# Patient Record
Sex: MALE | Race: BLACK OR AFRICAN AMERICAN | NOT HISPANIC OR LATINO
[De-identification: names, ages, dates, MRNs, and addresses within clinical notes are randomized per-mention and may not be internally consistent; named-entity substitution may affect disease eponyms.]

---

## 2017-09-13 ENCOUNTER — APPOINTMENT (OUTPATIENT)
Dept: BARIATRICS | Facility: CLINIC | Age: 57
End: 2017-09-13
Payer: COMMERCIAL

## 2017-09-13 VITALS
SYSTOLIC BLOOD PRESSURE: 112 MMHG | DIASTOLIC BLOOD PRESSURE: 69 MMHG | BODY MASS INDEX: 30.13 KG/M2 | HEART RATE: 71 BPM | TEMPERATURE: 97.5 F | OXYGEN SATURATION: 98 % | HEIGHT: 71.5 IN | WEIGHT: 220 LBS

## 2017-09-13 PROCEDURE — 99203 OFFICE O/P NEW LOW 30 MIN: CPT

## 2017-09-20 ENCOUNTER — FORM ENCOUNTER (OUTPATIENT)
Age: 57
End: 2017-09-20

## 2017-09-21 ENCOUNTER — APPOINTMENT (OUTPATIENT)
Dept: ORTHOPEDIC SURGERY | Facility: CLINIC | Age: 57
End: 2017-09-21
Payer: COMMERCIAL

## 2017-09-21 ENCOUNTER — OUTPATIENT (OUTPATIENT)
Dept: OUTPATIENT SERVICES | Facility: HOSPITAL | Age: 57
LOS: 1 days | End: 2017-09-21
Payer: COMMERCIAL

## 2017-09-21 DIAGNOSIS — Z41.9 ENCOUNTER FOR PROCEDURE FOR PURPOSES OTHER THAN REMEDYING HEALTH STATE, UNSPECIFIED: Chronic | ICD-10-CM

## 2017-09-21 DIAGNOSIS — Z47.89 ENCOUNTER FOR OTHER ORTHOPEDIC AFTERCARE: ICD-10-CM

## 2017-09-21 PROCEDURE — 73502 X-RAY EXAM HIP UNI 2-3 VIEWS: CPT | Mod: 26,RT

## 2017-09-21 PROCEDURE — 73502 X-RAY EXAM HIP UNI 2-3 VIEWS: CPT

## 2017-09-21 PROCEDURE — 99214 OFFICE O/P EST MOD 30 MIN: CPT

## 2017-10-13 NOTE — ASU PATIENT PROFILE, ADULT - PMH
Hernia    HTN (hypertension)    MMT (medial meniscus tear)  right knee  Osteoarthritis of knee  bilateral

## 2017-10-16 ENCOUNTER — OUTPATIENT (OUTPATIENT)
Dept: INPATIENT UNIT | Facility: HOSPITAL | Age: 57
LOS: 1 days | Discharge: HOME | End: 2017-10-16
Payer: COMMERCIAL

## 2017-10-16 ENCOUNTER — APPOINTMENT (OUTPATIENT)
Dept: BARIATRICS | Facility: HOSPITAL | Age: 57
End: 2017-10-16

## 2017-10-16 VITALS
HEIGHT: 72 IN | RESPIRATION RATE: 18 BRPM | SYSTOLIC BLOOD PRESSURE: 112 MMHG | TEMPERATURE: 99 F | WEIGHT: 218.7 LBS | HEART RATE: 63 BPM | DIASTOLIC BLOOD PRESSURE: 68 MMHG | OXYGEN SATURATION: 100 %

## 2017-10-16 VITALS
RESPIRATION RATE: 22 BRPM | SYSTOLIC BLOOD PRESSURE: 118 MMHG | DIASTOLIC BLOOD PRESSURE: 80 MMHG | HEART RATE: 60 BPM | OXYGEN SATURATION: 98 %

## 2017-10-16 DIAGNOSIS — Z41.9 ENCOUNTER FOR PROCEDURE FOR PURPOSES OTHER THAN REMEDYING HEALTH STATE, UNSPECIFIED: Chronic | ICD-10-CM

## 2017-10-16 LAB
BLD GP AB SCN SERPL QL: NEGATIVE — SIGNIFICANT CHANGE UP
GLUCOSE BLDC GLUCOMTR-MCNC: 87 MG/DL — SIGNIFICANT CHANGE UP (ref 70–99)
RH IG SCN BLD-IMP: POSITIVE — SIGNIFICANT CHANGE UP

## 2017-10-16 PROCEDURE — 86850 RBC ANTIBODY SCREEN: CPT

## 2017-10-16 PROCEDURE — C1781: CPT

## 2017-10-16 PROCEDURE — C1889: CPT

## 2017-10-16 PROCEDURE — 49650 LAP ING HERNIA REPAIR INIT: CPT | Mod: GC

## 2017-10-16 PROCEDURE — 86900 BLOOD TYPING SEROLOGIC ABO: CPT

## 2017-10-16 PROCEDURE — 82962 GLUCOSE BLOOD TEST: CPT

## 2017-10-16 PROCEDURE — 86901 BLOOD TYPING SEROLOGIC RH(D): CPT

## 2017-10-16 PROCEDURE — 49650 LAP ING HERNIA REPAIR INIT: CPT | Mod: RT

## 2017-10-16 RX ORDER — OXYCODONE AND ACETAMINOPHEN 5; 325 MG/1; MG/1
1 TABLET ORAL EVERY 4 HOURS
Qty: 0 | Refills: 0 | Status: DISCONTINUED | OUTPATIENT
Start: 2017-10-16 | End: 2017-10-16

## 2017-10-16 RX ORDER — MORPHINE SULFATE 50 MG/1
2 CAPSULE, EXTENDED RELEASE ORAL EVERY 4 HOURS
Qty: 0 | Refills: 0 | Status: DISCONTINUED | OUTPATIENT
Start: 2017-10-16 | End: 2017-10-16

## 2017-10-16 NOTE — BRIEF OPERATIVE NOTE - PROCEDURE
<<-----Click on this checkbox to enter Procedure Inguinal hernia repair with mesh  10/16/2017    Active  CHELY

## 2017-10-16 NOTE — BRIEF OPERATIVE NOTE - OPERATION/FINDINGS
Laparoscopic inguinal hernia repair with 3D max mesh on R side. Scarred-in incarcerated hernia with extensive reduction of sac.

## 2017-10-17 PROBLEM — K46.9 UNSPECIFIED ABDOMINAL HERNIA WITHOUT OBSTRUCTION OR GANGRENE: Chronic | Status: ACTIVE | Noted: 2017-10-13

## 2017-10-20 ENCOUNTER — OTHER (OUTPATIENT)
Age: 57
End: 2017-10-20

## 2017-10-20 DIAGNOSIS — K40.90 UNILATERAL INGUINAL HERNIA, WITHOUT OBSTRUCTION OR GANGRENE, NOT SPECIFIED AS RECURRENT: ICD-10-CM

## 2017-11-15 ENCOUNTER — APPOINTMENT (OUTPATIENT)
Dept: BARIATRICS | Facility: CLINIC | Age: 57
End: 2017-11-15
Payer: COMMERCIAL

## 2017-11-15 VITALS
SYSTOLIC BLOOD PRESSURE: 115 MMHG | HEART RATE: 61 BPM | DIASTOLIC BLOOD PRESSURE: 77 MMHG | OXYGEN SATURATION: 97 % | TEMPERATURE: 97.4 F | HEIGHT: 71.5 IN | WEIGHT: 222 LBS | BODY MASS INDEX: 30.4 KG/M2

## 2017-11-15 PROCEDURE — 99024 POSTOP FOLLOW-UP VISIT: CPT

## 2017-12-13 ENCOUNTER — APPOINTMENT (OUTPATIENT)
Dept: BARIATRICS | Facility: CLINIC | Age: 57
End: 2017-12-13
Payer: COMMERCIAL

## 2017-12-13 VITALS
WEIGHT: 230 LBS | DIASTOLIC BLOOD PRESSURE: 77 MMHG | TEMPERATURE: 97.4 F | BODY MASS INDEX: 31.5 KG/M2 | HEIGHT: 71.5 IN | HEART RATE: 69 BPM | OXYGEN SATURATION: 98 % | SYSTOLIC BLOOD PRESSURE: 120 MMHG

## 2017-12-13 PROCEDURE — 99024 POSTOP FOLLOW-UP VISIT: CPT

## 2018-01-23 ENCOUNTER — APPOINTMENT (OUTPATIENT)
Dept: ORTHOPEDIC SURGERY | Facility: CLINIC | Age: 58
End: 2018-01-23
Payer: COMMERCIAL

## 2018-01-23 VITALS — HEIGHT: 66 IN | BODY MASS INDEX: 34.87 KG/M2 | WEIGHT: 217 LBS

## 2018-01-23 PROCEDURE — 99214 OFFICE O/P EST MOD 30 MIN: CPT

## 2018-02-12 ENCOUNTER — LABORATORY RESULT (OUTPATIENT)
Age: 58
End: 2018-02-12

## 2018-02-14 ENCOUNTER — APPOINTMENT (OUTPATIENT)
Dept: BARIATRICS | Facility: CLINIC | Age: 58
End: 2018-02-14
Payer: COMMERCIAL

## 2018-02-14 VITALS
HEIGHT: 66 IN | OXYGEN SATURATION: 100 % | HEART RATE: 58 BPM | WEIGHT: 229 LBS | DIASTOLIC BLOOD PRESSURE: 72 MMHG | TEMPERATURE: 97.3 F | SYSTOLIC BLOOD PRESSURE: 120 MMHG | BODY MASS INDEX: 36.8 KG/M2

## 2018-02-14 PROCEDURE — 99213 OFFICE O/P EST LOW 20 MIN: CPT

## 2018-02-28 ENCOUNTER — OUTPATIENT (OUTPATIENT)
Dept: OUTPATIENT SERVICES | Facility: HOSPITAL | Age: 58
LOS: 1 days | End: 2018-02-28
Payer: COMMERCIAL

## 2018-02-28 DIAGNOSIS — Z22.321 CARRIER OR SUSPECTED CARRIER OF METHICILLIN SUSCEPTIBLE STAPHYLOCOCCUS AUREUS: ICD-10-CM

## 2018-02-28 DIAGNOSIS — Z41.9 ENCOUNTER FOR PROCEDURE FOR PURPOSES OTHER THAN REMEDYING HEALTH STATE, UNSPECIFIED: Chronic | ICD-10-CM

## 2018-02-28 LAB
MRSA PCR RESULT.: NEGATIVE — SIGNIFICANT CHANGE UP
S AUREUS DNA NOSE QL NAA+PROBE: NEGATIVE — SIGNIFICANT CHANGE UP

## 2018-02-28 PROCEDURE — 87641 MR-STAPH DNA AMP PROBE: CPT

## 2018-03-02 ENCOUNTER — APPOINTMENT (OUTPATIENT)
Dept: INTERNAL MEDICINE | Facility: CLINIC | Age: 58
End: 2018-03-02
Payer: COMMERCIAL

## 2018-03-02 VITALS
HEART RATE: 64 BPM | RESPIRATION RATE: 14 BRPM | OXYGEN SATURATION: 98 % | SYSTOLIC BLOOD PRESSURE: 114 MMHG | TEMPERATURE: 97.6 F | DIASTOLIC BLOOD PRESSURE: 70 MMHG | BODY MASS INDEX: 35.51 KG/M2 | WEIGHT: 220 LBS

## 2018-03-02 DIAGNOSIS — M17.10 UNILATERAL PRIMARY OSTEOARTHRITIS, UNSPECIFIED KNEE: ICD-10-CM

## 2018-03-02 DIAGNOSIS — Z01.818 ENCOUNTER FOR OTHER PREPROCEDURAL EXAMINATION: ICD-10-CM

## 2018-03-02 DIAGNOSIS — I10 ESSENTIAL (PRIMARY) HYPERTENSION: ICD-10-CM

## 2018-03-02 PROCEDURE — 99204 OFFICE O/P NEW MOD 45 MIN: CPT | Mod: 25

## 2018-03-02 PROCEDURE — 93000 ELECTROCARDIOGRAM COMPLETE: CPT

## 2018-03-12 ENCOUNTER — MEDICATION RENEWAL (OUTPATIENT)
Age: 58
End: 2018-03-12

## 2018-03-16 RX ORDER — ACETAMINOPHEN 500 MG
975 TABLET ORAL ONCE
Qty: 0 | Refills: 0 | Status: DISCONTINUED | OUTPATIENT
Start: 2018-03-19 | End: 2018-03-20

## 2018-03-16 RX ORDER — CELECOXIB 200 MG/1
400 CAPSULE ORAL ONCE
Qty: 0 | Refills: 0 | Status: COMPLETED | OUTPATIENT
Start: 2018-03-19 | End: 2018-03-19

## 2018-03-16 RX ORDER — OXYCODONE HYDROCHLORIDE 5 MG/1
10 TABLET ORAL ONCE
Qty: 0 | Refills: 0 | Status: DISCONTINUED | OUTPATIENT
Start: 2018-03-19 | End: 2018-03-19

## 2018-03-16 NOTE — H&P ADULT - NSHPLABSRESULTS_GEN_ALL_CORE
Preop cbc, bmp, pt/inr, ptt, ua, urine cx wnl; nasal swab neg  preop cxr wnl per clearance  preop ekg wnl per clearance

## 2018-03-16 NOTE — H&P ADULT - NSHPPHYSICALEXAM_GEN_ALL_CORE
Right hip decreased rom 2/2 pain  Rest of PE per MD clearance MSK- No erythema/ecchymosis, STS. SLT INTACT, DP/PT 2+, EHL/TA/GS 5/5 b/l les, Right hip decreased rom 2/2 pain  Rest of PE per MD clearance

## 2018-03-16 NOTE — H&P ADULT - HISTORY OF PRESENT ILLNESS
57M c/o right hip pain  Presents today for elective right THR. 57M c/o right hip pain x 2.5y. Pt. denies any accident of injury to right hip. Pt. c/o pain with walking and getting up. Pt. reports right hip pain has decreased his quality of life. Pt. states he has not done any physical therapy or conservative treatments for right hip. Denies any numbness/tingling in b/l les or walker assistance.   Presents today for elective right THR. 57M c/o right hip pain x 2.5y. Pt. denies any accident of injury to right hip. Pt. c/o pain with walking and getting up. Pt. reports right hip pain has decreased his quality of life. No improvement with analgesic medication, activity modification and therapeutic exercise. Denies any numbness/tingling in b/l les or walker assistance.   Presents today for elective right THR.

## 2018-03-19 ENCOUNTER — INPATIENT (INPATIENT)
Facility: HOSPITAL | Age: 58
LOS: 0 days | Discharge: HOME | DRG: 470 | End: 2018-03-20
Attending: ORTHOPAEDIC SURGERY | Admitting: ORTHOPAEDIC SURGERY
Payer: COMMERCIAL

## 2018-03-19 ENCOUNTER — APPOINTMENT (OUTPATIENT)
Dept: ORTHOPEDIC SURGERY | Facility: HOSPITAL | Age: 58
End: 2018-03-19

## 2018-03-19 ENCOUNTER — RESULT REVIEW (OUTPATIENT)
Age: 58
End: 2018-03-19

## 2018-03-19 VITALS
DIASTOLIC BLOOD PRESSURE: 71 MMHG | SYSTOLIC BLOOD PRESSURE: 132 MMHG | RESPIRATION RATE: 14 BRPM | WEIGHT: 222.89 LBS | HEART RATE: 63 BPM | TEMPERATURE: 97 F | HEIGHT: 72 IN

## 2018-03-19 DIAGNOSIS — M16.11 UNILATERAL PRIMARY OSTEOARTHRITIS, RIGHT HIP: ICD-10-CM

## 2018-03-19 DIAGNOSIS — Z41.9 ENCOUNTER FOR PROCEDURE FOR PURPOSES OTHER THAN REMEDYING HEALTH STATE, UNSPECIFIED: Chronic | ICD-10-CM

## 2018-03-19 DIAGNOSIS — I10 ESSENTIAL (PRIMARY) HYPERTENSION: ICD-10-CM

## 2018-03-19 PROCEDURE — 27130 TOTAL HIP ARTHROPLASTY: CPT | Mod: RT

## 2018-03-19 PROCEDURE — 72170 X-RAY EXAM OF PELVIS: CPT | Mod: 26

## 2018-03-19 RX ORDER — SODIUM CHLORIDE 9 MG/ML
1000 INJECTION, SOLUTION INTRAVENOUS
Qty: 0 | Refills: 0 | Status: DISCONTINUED | OUTPATIENT
Start: 2018-03-19 | End: 2018-03-20

## 2018-03-19 RX ORDER — KETOROLAC TROMETHAMINE 30 MG/ML
15 SYRINGE (ML) INJECTION ONCE
Qty: 0 | Refills: 0 | Status: DISCONTINUED | OUTPATIENT
Start: 2018-03-19 | End: 2018-03-20

## 2018-03-19 RX ORDER — ACETAMINOPHEN 500 MG
650 TABLET ORAL EVERY 6 HOURS
Qty: 0 | Refills: 0 | Status: DISCONTINUED | OUTPATIENT
Start: 2018-03-19 | End: 2018-03-20

## 2018-03-19 RX ORDER — CELECOXIB 200 MG/1
200 CAPSULE ORAL
Qty: 0 | Refills: 0 | Status: DISCONTINUED | OUTPATIENT
Start: 2018-03-19 | End: 2018-03-20

## 2018-03-19 RX ORDER — SODIUM CHLORIDE 9 MG/ML
1000 INJECTION, SOLUTION INTRAVENOUS
Qty: 0 | Refills: 0 | Status: DISCONTINUED | OUTPATIENT
Start: 2018-03-19 | End: 2018-03-19

## 2018-03-19 RX ORDER — POLYETHYLENE GLYCOL 3350 17 G/17G
17 POWDER, FOR SOLUTION ORAL DAILY
Qty: 0 | Refills: 0 | Status: DISCONTINUED | OUTPATIENT
Start: 2018-03-19 | End: 2018-03-20

## 2018-03-19 RX ORDER — BUPIVACAINE 13.3 MG/ML
20 INJECTION, SUSPENSION, LIPOSOMAL INFILTRATION ONCE
Qty: 0 | Refills: 0 | Status: DISCONTINUED | OUTPATIENT
Start: 2018-03-19 | End: 2018-03-20

## 2018-03-19 RX ORDER — OXYCODONE HYDROCHLORIDE 5 MG/1
10 TABLET ORAL EVERY 4 HOURS
Qty: 0 | Refills: 0 | Status: DISCONTINUED | OUTPATIENT
Start: 2018-03-19 | End: 2018-03-20

## 2018-03-19 RX ORDER — OXYCODONE AND ACETAMINOPHEN 5; 325 MG/1; MG/1
2 TABLET ORAL EVERY 4 HOURS
Qty: 0 | Refills: 0 | Status: DISCONTINUED | OUTPATIENT
Start: 2018-03-19 | End: 2018-03-19

## 2018-03-19 RX ORDER — MORPHINE SULFATE 50 MG/1
2 CAPSULE, EXTENDED RELEASE ORAL
Qty: 0 | Refills: 0 | Status: DISCONTINUED | OUTPATIENT
Start: 2018-03-19 | End: 2018-03-20

## 2018-03-19 RX ORDER — CEFAZOLIN SODIUM 1 G
2000 VIAL (EA) INJECTION EVERY 8 HOURS
Qty: 0 | Refills: 0 | Status: COMPLETED | OUTPATIENT
Start: 2018-03-19 | End: 2018-03-19

## 2018-03-19 RX ORDER — DOCUSATE SODIUM 100 MG
100 CAPSULE ORAL THREE TIMES A DAY
Qty: 0 | Refills: 0 | Status: DISCONTINUED | OUTPATIENT
Start: 2018-03-19 | End: 2018-03-20

## 2018-03-19 RX ORDER — OXYCODONE AND ACETAMINOPHEN 5; 325 MG/1; MG/1
1 TABLET ORAL EVERY 4 HOURS
Qty: 0 | Refills: 0 | Status: DISCONTINUED | OUTPATIENT
Start: 2018-03-19 | End: 2018-03-19

## 2018-03-19 RX ORDER — PANTOPRAZOLE SODIUM 20 MG/1
40 TABLET, DELAYED RELEASE ORAL DAILY
Qty: 0 | Refills: 0 | Status: DISCONTINUED | OUTPATIENT
Start: 2018-03-19 | End: 2018-03-20

## 2018-03-19 RX ORDER — MAGNESIUM HYDROXIDE 400 MG/1
30 TABLET, CHEWABLE ORAL DAILY
Qty: 0 | Refills: 0 | Status: DISCONTINUED | OUTPATIENT
Start: 2018-03-19 | End: 2018-03-20

## 2018-03-19 RX ORDER — SENNA PLUS 8.6 MG/1
2 TABLET ORAL AT BEDTIME
Qty: 0 | Refills: 0 | Status: DISCONTINUED | OUTPATIENT
Start: 2018-03-19 | End: 2018-03-20

## 2018-03-19 RX ORDER — OXYCODONE HYDROCHLORIDE 5 MG/1
5 TABLET ORAL EVERY 4 HOURS
Qty: 0 | Refills: 0 | Status: DISCONTINUED | OUTPATIENT
Start: 2018-03-19 | End: 2018-03-20

## 2018-03-19 RX ORDER — ASPIRIN/CALCIUM CARB/MAGNESIUM 324 MG
325 TABLET ORAL
Qty: 0 | Refills: 0 | Status: DISCONTINUED | OUTPATIENT
Start: 2018-03-19 | End: 2018-03-20

## 2018-03-19 RX ORDER — BISOPROLOL FUMARATE 10 MG/1
10 TABLET, FILM COATED ORAL DAILY
Qty: 0 | Refills: 0 | Status: DISCONTINUED | OUTPATIENT
Start: 2018-03-19 | End: 2018-03-20

## 2018-03-19 RX ORDER — MORPHINE SULFATE 50 MG/1
3 CAPSULE, EXTENDED RELEASE ORAL
Qty: 0 | Refills: 0 | Status: DISCONTINUED | OUTPATIENT
Start: 2018-03-19 | End: 2018-03-20

## 2018-03-19 RX ORDER — ONDANSETRON 8 MG/1
4 TABLET, FILM COATED ORAL EVERY 6 HOURS
Qty: 0 | Refills: 0 | Status: DISCONTINUED | OUTPATIENT
Start: 2018-03-19 | End: 2018-03-20

## 2018-03-19 RX ADMIN — SODIUM CHLORIDE 200 MILLILITER(S): 9 INJECTION, SOLUTION INTRAVENOUS at 16:00

## 2018-03-19 RX ADMIN — Medication 650 MILLIGRAM(S): at 23:09

## 2018-03-19 RX ADMIN — OXYCODONE HYDROCHLORIDE 10 MILLIGRAM(S): 5 TABLET ORAL at 09:09

## 2018-03-19 RX ADMIN — CELECOXIB 400 MILLIGRAM(S): 200 CAPSULE ORAL at 09:08

## 2018-03-19 RX ADMIN — Medication 100 MILLIGRAM(S): at 23:08

## 2018-03-19 RX ADMIN — Medication 100 MILLIGRAM(S): at 18:19

## 2018-03-19 NOTE — PHYSICAL THERAPY INITIAL EVALUATION ADULT - CRITERIA FOR SKILLED THERAPEUTIC INTERVENTIONS
rehab potential/impairments found/anticipated discharge recommendation/functional limitations in following categories/anticipated equipment needs at discharge/therapy frequency

## 2018-03-19 NOTE — PROGRESS NOTE ADULT - SUBJECTIVE AND OBJECTIVE BOX
Ortho Post Op Check    Procedure: Right THR  Surgeon: Dr. Haro    Pt resting comfortably without complaints, pain well controlled.  Denies CP, SOB, N/V, numbness/tingling of extremities.    Vital Signs Last 24 Hrs  T(C): 36.5 (03-19-18 @ 12:50), Max: 36.5 (03-19-18 @ 12:50)  T(F): 97.7 (03-19-18 @ 12:50), Max: 97.7 (03-19-18 @ 12:50)  HR: 52 (03-19-18 @ 14:05) (52 - 63)  BP: 115/68 (03-19-18 @ 14:05) (109/58 - 118/66)  BP(mean): --  RR: 16 (03-19-18 @ 14:05) (15 - 16)  SpO2: 100% (03-19-18 @ 14:05) (99% - 100%)  AVSS    General: Pt Alert and oriented, NAD  DSG C/D/I right hip  Pulses: DP pulses 2+, toes wwp, cap refill brisk  Sensation: intact and equal to light touch  Motor: EHL/FHL/TA/GS 5/5 strength b/l      Post-op X-Ray: right hip prosthesis intact without fracture or foreign body       A/P: 57yMale POD#0 s/p right THR  - Stable  - Pain Control  - DVT ppx: ASA 325mg BID  - Post op abx: Ancef  - PT, WBS: WBAT  - F/u AM labs    Ortho Pager [phone]

## 2018-03-19 NOTE — PHYSICAL THERAPY INITIAL EVALUATION ADULT - GENERAL OBSERVATIONS, REHAB EVAL
Pt received supine in bed with b/l SCD, +EKG, +IV, NAD. Pt left supine in bed, NAD. Pt complains right hip pain~2/10 on pain scale

## 2018-03-20 ENCOUNTER — TRANSCRIPTION ENCOUNTER (OUTPATIENT)
Age: 58
End: 2018-03-20

## 2018-03-20 VITALS
DIASTOLIC BLOOD PRESSURE: 67 MMHG | RESPIRATION RATE: 16 BRPM | OXYGEN SATURATION: 99 % | SYSTOLIC BLOOD PRESSURE: 112 MMHG | TEMPERATURE: 97 F | HEART RATE: 55 BPM

## 2018-03-20 LAB
ANION GAP SERPL CALC-SCNC: 9 MMOL/L — SIGNIFICANT CHANGE UP (ref 5–17)
BUN SERPL-MCNC: 27 MG/DL — HIGH (ref 7–23)
CALCIUM SERPL-MCNC: 8.1 MG/DL — LOW (ref 8.4–10.5)
CHLORIDE SERPL-SCNC: 103 MMOL/L — SIGNIFICANT CHANGE UP (ref 96–108)
CO2 SERPL-SCNC: 25 MMOL/L — SIGNIFICANT CHANGE UP (ref 22–31)
CREAT SERPL-MCNC: 1.13 MG/DL — SIGNIFICANT CHANGE UP (ref 0.5–1.3)
GLUCOSE SERPL-MCNC: 115 MG/DL — HIGH (ref 70–99)
HCT VFR BLD CALC: 31 % — LOW (ref 39–50)
HGB BLD-MCNC: 10 G/DL — LOW (ref 13–17)
MCHC RBC-ENTMCNC: 25.9 PG — LOW (ref 27–34)
MCHC RBC-ENTMCNC: 32.3 G/DL — SIGNIFICANT CHANGE UP (ref 32–36)
MCV RBC AUTO: 80.3 FL — SIGNIFICANT CHANGE UP (ref 80–100)
PLATELET # BLD AUTO: 168 K/UL — SIGNIFICANT CHANGE UP (ref 150–400)
POTASSIUM SERPL-MCNC: 4 MMOL/L — SIGNIFICANT CHANGE UP (ref 3.5–5.3)
POTASSIUM SERPL-SCNC: 4 MMOL/L — SIGNIFICANT CHANGE UP (ref 3.5–5.3)
RBC # BLD: 3.86 M/UL — LOW (ref 4.2–5.8)
RBC # FLD: 15.4 % — SIGNIFICANT CHANGE UP (ref 10.3–16.9)
SODIUM SERPL-SCNC: 137 MMOL/L — SIGNIFICANT CHANGE UP (ref 135–145)
WBC # BLD: 9.5 K/UL — SIGNIFICANT CHANGE UP (ref 3.8–10.5)
WBC # FLD AUTO: 9.5 K/UL — SIGNIFICANT CHANGE UP (ref 3.8–10.5)

## 2018-03-20 PROCEDURE — 99253 IP/OBS CNSLTJ NEW/EST LOW 45: CPT

## 2018-03-20 PROCEDURE — 99232 SBSQ HOSP IP/OBS MODERATE 35: CPT

## 2018-03-20 RX ORDER — ASPIRIN/CALCIUM CARB/MAGNESIUM 324 MG
1 TABLET ORAL
Qty: 0 | Refills: 0 | DISCHARGE
Start: 2018-03-20

## 2018-03-20 RX ORDER — CELECOXIB 200 MG/1
1 CAPSULE ORAL
Qty: 0 | Refills: 0 | DISCHARGE
Start: 2018-03-20

## 2018-03-20 RX ADMIN — Medication 100 MILLIGRAM(S): at 05:13

## 2018-03-20 RX ADMIN — CELECOXIB 200 MILLIGRAM(S): 200 CAPSULE ORAL at 05:55

## 2018-03-20 RX ADMIN — Medication 325 MILLIGRAM(S): at 05:13

## 2018-03-20 RX ADMIN — OXYCODONE HYDROCHLORIDE 5 MILLIGRAM(S): 5 TABLET ORAL at 12:40

## 2018-03-20 RX ADMIN — Medication 650 MILLIGRAM(S): at 11:53

## 2018-03-20 RX ADMIN — OXYCODONE HYDROCHLORIDE 5 MILLIGRAM(S): 5 TABLET ORAL at 11:53

## 2018-03-20 RX ADMIN — CELECOXIB 200 MILLIGRAM(S): 200 CAPSULE ORAL at 05:13

## 2018-03-20 NOTE — CONSULT NOTE ADULT - ASSESSMENT
58 yo m with HTN and severe right hip pain now post op day#1 from an elective right THR    1) HTN- continue bisoprolol, stable overnight  2) dvt ppx with asa bid and scd's  3) pain manaement, bowel regimen  4) f/up am labs

## 2018-03-20 NOTE — PROGRESS NOTE ADULT - SUBJECTIVE AND OBJECTIVE BOX
S: Patient seen and examined. Doing well this AM. Pain reported but controlled. No acute events overnight.    O:   Vital Signs Last 24 Hrs  T(C): 37.4 (20 Mar 2018 00:29), Max: 37.4 (20 Mar 2018 00:29)  T(F): 99.4 (20 Mar 2018 00:29), Max: 99.4 (20 Mar 2018 00:29)  HR: 66 (20 Mar 2018 00:29) (52 - 78)  BP: 111/62 (20 Mar 2018 00:29) (109/58 - 132/71)  BP(mean): 82 (19 Mar 2018 18:50) (78 - 95)  RR: 16 (20 Mar 2018 00:29) (14 - 42)  SpO2: 98% (20 Mar 2018 00:29) (98% - 100%)    Motor: 5/5 GS/TA/EHL/FHL BL   SILT to patients baseline BL  WWP DP PT BL  Dressing C/D/I

## 2018-03-20 NOTE — DISCHARGE NOTE ADULT - PATIENT PORTAL LINK FT
You can access the [facility] Patient Portal, offered by [facility], by registering with the following website: [email or web address]

## 2018-03-20 NOTE — DISCHARGE NOTE ADULT - ADDITIONAL INSTRUCTIONS
**Please take pain medications as prescribed by Dr. Haro.**  You may refer to Dr. Haro's separate instruction packet for further information.  No strenuous activity, heavy lifting, driving or returning to work until cleared by MD.  You may shower - dressing is water-resistant, no soaking in bathtubs.  Remove dressing after post op day 5-7, then leave incision open to air. Keep incision clean and dry.  Try to have regular bowel movements, take stool softener or laxative if necessary.  May take Pepcid or Zantac for upset stomach.  May take Aleve or Naproxen instead of Meloxicam.  Swelling may travel all the way down leg to foot, this is normal and will subside in a few weeks.  Call to schedule an appointment with Dr. Haro for follow up, if you have staples or sutures they will be removed in office.  Contact your doctor if you experience: fever greater than 101.5, chills, chest pain, difficulty breathing, redness or excessive drainage around the incision, other concerns.   Follow up with your primary care provider.

## 2018-03-20 NOTE — DISCHARGE NOTE ADULT - MEDICATION SUMMARY - MEDICATIONS TO TAKE
I will START or STAY ON the medications listed below when I get home from the hospital:    aspirin 325 mg oral delayed release tablet  -- 1 tab(s) by mouth 2 times a day  -- Indication: For Clot prevention    celecoxib 200 mg oral capsule  -- 1 cap(s) by mouth 2 times a day  -- Indication: For Pain/Inflammation    Percocet 5/325 oral tablet  -- 1-2 tab(s) by mouth every 4-6 hours, as needed for pain.  -- Indication: For Pain    bisoprolol 10 mg oral tablet  -- 1 tab(s) by mouth once a day  -- Indication: For HTN (hypertension)    omeprazole 20 mg oral delayed release capsule  -- 1 cap(s) by mouth once a day  -- Indication: For GI prophylaxis

## 2018-03-20 NOTE — DISCHARGE NOTE ADULT - CARE PLAN
Principal Discharge DX:	Primary osteoarthritis of right hip  Goal:	Improvement after surgery.  Assessment and plan of treatment:	See below.

## 2018-03-20 NOTE — DISCHARGE NOTE ADULT - MEDICATION SUMMARY - MEDICATIONS TO STOP TAKING
I will STOP taking the medications listed below when I get home from the hospital:    Aleve 220 mg oral tablet

## 2018-03-20 NOTE — PROGRESS NOTE ADULT - SUBJECTIVE AND OBJECTIVE BOX
Chief Complaint/Reason for Consult: periop cv mgmt  INTERVAL HPI: post op s complications no cp sob palp  	  MEDICATIONS:  bisoprolol   Tablet 10 milliGRAM(s) Oral daily        acetaminophen   Tablet 650 milliGRAM(s) Oral every 6 hours PRN  acetaminophen   Tablet 650 milliGRAM(s) Oral every 6 hours  acetaminophen   Tablet. 975 milliGRAM(s) Oral once  celecoxib 200 milliGRAM(s) Oral two times a day  ketorolac   Injectable 15 milliGRAM(s) IV Push once  morphine  - Injectable 2 milliGRAM(s) IV Push every 10 minutes PRN  morphine  - Injectable 3 milliGRAM(s) IV Push every 3 hours PRN  ondansetron Injectable 4 milliGRAM(s) IV Push every 6 hours PRN  oxyCODONE    IR 5 milliGRAM(s) Oral every 4 hours PRN  oxyCODONE    IR 10 milliGRAM(s) Oral every 4 hours PRN    aluminum hydroxide/magnesium hydroxide/simethicone Suspension 30 milliLiter(s) Oral four times a day PRN  docusate sodium 100 milliGRAM(s) Oral three times a day  magnesium hydroxide Suspension 30 milliLiter(s) Oral daily PRN  pantoprazole    Tablet 40 milliGRAM(s) Oral daily  polyethylene glycol 3350 17 Gram(s) Oral daily  senna 2 Tablet(s) Oral at bedtime PRN      aspirin enteric coated 325 milliGRAM(s) Oral two times a day  lactated ringers. 1000 milliLiter(s) IV Continuous <Continuous>      REVIEW OF SYSTEMS:  [x] As per HPI  CONSTITUTIONAL: No fever, weight loss, or fatigue  RESPIRATORY: No cough, wheezing, chills or hemoptysis; No Shortness of Breath  CARDIOVASCULAR: No chest pain, palpitations, dizziness, or leg swelling  GASTROINTESTINAL: No abdominal or epigastric pain. No nausea, vomiting, or hematemesis; No diarrhea or constipation. No melena or hematochezia.  MUSCULOSKELETAL: No joint pain or swelling; No muscle, back, or extremity pain  [x] All others negative	  [ ] Unable to obtain    PHYSICAL EXAM:  T(C): 36.1 (03-20-18 @ 08:21), Max: 37.4 (03-20-18 @ 00:29)  HR: 55 (03-20-18 @ 08:21) (52 - 78)  BP: 112/67 (03-20-18 @ 08:21) (99/56 - 125/75)  RR: 16 (03-20-18 @ 08:21) (15 - 42)  SpO2: 99% (03-20-18 @ 08:21) (98% - 100%)  Wt(kg): --  I&O's Summary    19 Mar 2018 07:01  -  20 Mar 2018 07:00  --------------------------------------------------------  IN: 1920 mL / OUT: 300 mL / NET: 1620 mL    20 Mar 2018 07:01  -  20 Mar 2018 13:42  --------------------------------------------------------  IN: 340 mL / OUT: 400 mL / NET: -60 mL          Appearance: Normal	  HEENT:   Normal oral mucosa  Cardiovascular: Normal S1 S2, No JVD, No murmurs, No edema  Respiratory: Lungs clear to auscultation	  Gastrointestinal:  Soft, Non-tender, + BS	  Extremities: Normal range of motion, No clubbing, cyanosis or edema  Vascular: Peripheral pulses palpable 2+ bilaterally    TELEMETRY: 	    ECG:   	  RADIOLOGY:   CXR:  CT:  US:    CARDIAC TESTING:  Echocardiogram:  Catheterization:  Stress Test:      LABS:	 	    CARDIAC MARKERS:                                  10.0   9.5   )-----------( 168      ( 20 Mar 2018 06:37 )             31.0     03-20    137  |  103  |  27<H>  ----------------------------<  115<H>  4.0   |  25  |  1.13    Ca    8.1<L>      20 Mar 2018 06:37      proBNP:   Lipid Profile:   HgA1c:   TSH:     ASSESSMENT/PLAN: 	    # post op s complications no cp sob palp    #CAD - no cp sob  ekg non ischemic    #HTN - pain control and continue bisoprolol    #CV Prevention -   q3mo Fasting Lipid Profile, Goal LDL<100, statin as tolerated.  q6week TSH check  q3mo 25-OHD Vitamin D Level, Goal 50, supplement as tolerated

## 2018-03-20 NOTE — PROGRESS NOTE ADULT - ASSESSMENT
A/P: 57 y M s/p R SONU  -stable  -pain controlled  -PT/WBAT  -ASA  -diet as tolerated  -f/u labs  -disposition: home

## 2018-03-20 NOTE — DISCHARGE NOTE ADULT - HOSPITAL COURSE
Admitted  Surgery Right anterior SONU 3/19/18  Chayo-op Antibiotics  Pain control  DVT prophylaxis  OOB/Physical Therapy

## 2018-03-20 NOTE — CONSULT NOTE ADULT - SUBJECTIVE AND OBJECTIVE BOX
Patient seen and examined, Chart reviewed    HPI:  57M c/o right hip pain x 2.5y. Pt. denies any accident of injury to right hip. Pt. c/o pain with walking and getting up. Pt. reports right hip pain has decreased his quality of life. No improvement with analgesic medication, activity modification and therapeutic exercise. Denies any numbness/tingling in b/l les or walker assistance.   He is seen today post op day #1 from anelective right THR.       PAST MEDICAL & SURGICAL HISTORY:  Hernia  MMT (medial meniscus tear): right knee  Osteoarthritis of knee: bilateral  HTN (hypertension)  Elective surgery: knee arthroscopy of right knee      REVIEW OF SYSTEMS:  CONSTITUTIONAL:  No night sweats.  No fatigue,  No fever or chills.  HEENT:  Eyes:  No visual changes.  .  RESPIRATORY:  No cough.  No wheeze.    No shortness of breath.  CARDIOVASCULAR:  No chest pains.  No palpitations.  GASTROINTESTINAL:  No abdominal pain.  No nausea or vomiting.  No diarrhea     GENITOURINARY:  No urgency.  No frequency.  No dysuria.  No hematuria.    MUSCULOSKELETAL:  right hip pain present    SKIN:  No rashes .      acetaminophen   Tablet 650 milliGRAM(s) Oral every 6 hours PRN  acetaminophen   Tablet 650 milliGRAM(s) Oral every 6 hours  acetaminophen   Tablet. 975 milliGRAM(s) Oral once  aluminum hydroxide/magnesium hydroxide/simethicone Suspension 30 milliLiter(s) Oral four times a day PRN  aspirin enteric coated 325 milliGRAM(s) Oral two times a day  bisoprolol   Tablet 10 milliGRAM(s) Oral daily  BUpivacaine liposome 1.3% Injectable (no eMAR) 20 milliLiter(s) Local Injection once  celecoxib 200 milliGRAM(s) Oral two times a day  docusate sodium 100 milliGRAM(s) Oral three times a day  ketorolac   Injectable 15 milliGRAM(s) IV Push once  lactated ringers. 1000 milliLiter(s) IV Continuous <Continuous>  magnesium hydroxide Suspension 30 milliLiter(s) Oral daily PRN  morphine  - Injectable 2 milliGRAM(s) IV Push every 10 minutes PRN  morphine  - Injectable 3 milliGRAM(s) IV Push every 3 hours PRN  ondansetron Injectable 4 milliGRAM(s) IV Push every 6 hours PRN  oxyCODONE    IR 5 milliGRAM(s) Oral every 4 hours PRN  oxyCODONE    IR 10 milliGRAM(s) Oral every 4 hours PRN  pantoprazole    Tablet 40 milliGRAM(s) Oral daily  polyethylene glycol 3350 17 Gram(s) Oral daily  senna 2 Tablet(s) Oral at bedtime PRN      Allergies    No Known Allergies    Intolerances        SOCIAL HISTORY: no tob    FAMILY HISTORY:  nc    Vital Signs Last 24 Hrs  T(C): 37.4 (20 Mar 2018 00:29), Max: 37.4 (20 Mar 2018 00:29)  T(F): 99.4 (20 Mar 2018 00:29), Max: 99.4 (20 Mar 2018 00:29)  HR: 66 (20 Mar 2018 00:29) (52 - 78)  BP: 111/62 (20 Mar 2018 00:29) (109/58 - 132/71)  BP(mean): 82 (19 Mar 2018 18:50) (78 - 95)  RR: 16 (20 Mar 2018 00:29) (14 - 42)  SpO2: 98% (20 Mar 2018 00:29) (98% - 100%)    03-19 @ 07:01  -  03-20 @ 07:00  --------------------------------------------------------  IN: 1920 mL / OUT: 300 mL / NET: 1620 mL        PHYSICAL EXAM:   General - NAD, Alert and oriented x 3,   Neck - - No lymphadenopathy  Cardiovascular - RRR no m/r/g, no JVD  Lungs - Clear to auscultation no use of acessory muscles, no crackles or wheezes.  Skin - No rashes, skin warm and dry,   Abdomen - Normal bowel sounds, abdomen soft and nontender  Extremeties - No edema,  scds on b/l  le  Neurological – no focal deficits      LABS:    03-20    137  |  103  |  27<H>  ----------------------------<  115<H>  4.0   |  25  |  1.13    Ca    8.1<L>      20 Mar 2018 06:37            RADIOLOGY & ADDITIONAL STUDIES:

## 2018-03-22 DIAGNOSIS — M25.551 PAIN IN RIGHT HIP: ICD-10-CM

## 2018-03-22 DIAGNOSIS — M16.31 UNILATERAL OSTEOARTHRITIS RESULTING FROM HIP DYSPLASIA, RIGHT HIP: ICD-10-CM

## 2018-03-22 DIAGNOSIS — M17.9 OSTEOARTHRITIS OF KNEE, UNSPECIFIED: ICD-10-CM

## 2018-03-22 DIAGNOSIS — I10 ESSENTIAL (PRIMARY) HYPERTENSION: ICD-10-CM

## 2018-03-22 DIAGNOSIS — M19.90 UNSPECIFIED OSTEOARTHRITIS, UNSPECIFIED SITE: ICD-10-CM

## 2018-03-22 DIAGNOSIS — K46.9 UNSPECIFIED ABDOMINAL HERNIA WITHOUT OBSTRUCTION OR GANGRENE: ICD-10-CM

## 2018-03-22 LAB — SURGICAL PATHOLOGY STUDY: SIGNIFICANT CHANGE UP

## 2018-03-22 PROCEDURE — 85027 COMPLETE CBC AUTOMATED: CPT

## 2018-03-22 PROCEDURE — 97161 PT EVAL LOW COMPLEX 20 MIN: CPT

## 2018-03-22 PROCEDURE — 86850 RBC ANTIBODY SCREEN: CPT

## 2018-03-22 PROCEDURE — 86901 BLOOD TYPING SEROLOGIC RH(D): CPT

## 2018-03-22 PROCEDURE — C1713: CPT

## 2018-03-22 PROCEDURE — 86900 BLOOD TYPING SEROLOGIC ABO: CPT

## 2018-03-22 PROCEDURE — C1776: CPT

## 2018-03-22 PROCEDURE — 72170 X-RAY EXAM OF PELVIS: CPT

## 2018-03-22 PROCEDURE — 76000 FLUOROSCOPY <1 HR PHYS/QHP: CPT

## 2018-03-22 PROCEDURE — 97116 GAIT TRAINING THERAPY: CPT

## 2018-03-22 PROCEDURE — 88300 SURGICAL PATH GROSS: CPT

## 2018-03-22 PROCEDURE — 80048 BASIC METABOLIC PNL TOTAL CA: CPT

## 2018-03-22 PROCEDURE — 36415 COLL VENOUS BLD VENIPUNCTURE: CPT

## 2018-04-02 ENCOUNTER — FORM ENCOUNTER (OUTPATIENT)
Age: 58
End: 2018-04-02

## 2018-04-03 ENCOUNTER — APPOINTMENT (OUTPATIENT)
Dept: ORTHOPEDIC SURGERY | Facility: CLINIC | Age: 58
End: 2018-04-03
Payer: COMMERCIAL

## 2018-04-03 ENCOUNTER — OUTPATIENT (OUTPATIENT)
Dept: OUTPATIENT SERVICES | Facility: HOSPITAL | Age: 58
LOS: 1 days | End: 2018-04-03
Payer: COMMERCIAL

## 2018-04-03 DIAGNOSIS — Z41.9 ENCOUNTER FOR PROCEDURE FOR PURPOSES OTHER THAN REMEDYING HEALTH STATE, UNSPECIFIED: Chronic | ICD-10-CM

## 2018-04-03 PROCEDURE — 73501 X-RAY EXAM HIP UNI 1 VIEW: CPT | Mod: 26,RT

## 2018-04-03 PROCEDURE — 99024 POSTOP FOLLOW-UP VISIT: CPT

## 2018-04-03 PROCEDURE — 73501 X-RAY EXAM HIP UNI 1 VIEW: CPT

## 2018-04-03 RX ORDER — PANTOPRAZOLE 40 MG/1
40 TABLET, DELAYED RELEASE ORAL DAILY
Qty: 30 | Refills: 0 | Status: DISCONTINUED | COMMUNITY
Start: 2018-03-12 | End: 2018-04-03

## 2018-04-03 RX ORDER — OXYCODONE AND ACETAMINOPHEN 5; 325 MG/1; MG/1
5-325 TABLET ORAL
Qty: 70 | Refills: 0 | Status: DISCONTINUED | COMMUNITY
Start: 2018-03-12 | End: 2018-04-03

## 2018-05-08 ENCOUNTER — APPOINTMENT (OUTPATIENT)
Dept: ORTHOPEDIC SURGERY | Facility: CLINIC | Age: 58
End: 2018-05-08

## 2018-05-11 ENCOUNTER — MEDICATION RENEWAL (OUTPATIENT)
Age: 58
End: 2018-05-11

## 2018-05-17 ENCOUNTER — APPOINTMENT (OUTPATIENT)
Dept: UROLOGY | Facility: CLINIC | Age: 58
End: 2018-05-17
Payer: COMMERCIAL

## 2018-05-17 VITALS — DIASTOLIC BLOOD PRESSURE: 67 MMHG | TEMPERATURE: 98.4 F | SYSTOLIC BLOOD PRESSURE: 104 MMHG | HEART RATE: 66 BPM

## 2018-05-17 PROCEDURE — 99204 OFFICE O/P NEW MOD 45 MIN: CPT

## 2018-05-22 ENCOUNTER — APPOINTMENT (OUTPATIENT)
Dept: ORTHOPEDIC SURGERY | Facility: CLINIC | Age: 58
End: 2018-05-22
Payer: COMMERCIAL

## 2018-05-22 VITALS — WEIGHT: 220 LBS | HEIGHT: 72 IN | BODY MASS INDEX: 29.8 KG/M2

## 2018-05-22 DIAGNOSIS — Z47.1 AFTERCARE FOLLOWING JOINT REPLACEMENT SURGERY: ICD-10-CM

## 2018-05-22 PROCEDURE — 99024 POSTOP FOLLOW-UP VISIT: CPT

## 2018-05-22 RX ORDER — CELECOXIB 200 MG/1
200 CAPSULE ORAL TWICE DAILY
Qty: 60 | Refills: 1 | Status: DISCONTINUED | COMMUNITY
Start: 2018-03-12 | End: 2018-05-22

## 2018-05-22 RX ORDER — ASPIRIN/ACETAMINOPHEN/CAFFEINE 500-325-65
325 POWDER IN PACKET (EA) ORAL
Qty: 60 | Refills: 0 | Status: DISCONTINUED | COMMUNITY
Start: 2018-03-12 | End: 2018-05-22

## 2018-05-23 ENCOUNTER — APPOINTMENT (OUTPATIENT)
Dept: BARIATRICS | Facility: CLINIC | Age: 58
End: 2018-05-23
Payer: COMMERCIAL

## 2018-05-23 VITALS
SYSTOLIC BLOOD PRESSURE: 119 MMHG | HEIGHT: 72 IN | OXYGEN SATURATION: 100 % | DIASTOLIC BLOOD PRESSURE: 75 MMHG | HEART RATE: 67 BPM | BODY MASS INDEX: 31.29 KG/M2 | WEIGHT: 231 LBS | TEMPERATURE: 97.7 F

## 2018-05-23 PROCEDURE — 99214 OFFICE O/P EST MOD 30 MIN: CPT

## 2018-06-17 ENCOUNTER — FORM ENCOUNTER (OUTPATIENT)
Age: 58
End: 2018-06-17

## 2018-07-22 PROBLEM — Z47.1 AFTERCARE FOLLOWING JOINT REPLACEMENT: Status: ACTIVE | Noted: 2018-05-22

## 2018-09-21 NOTE — ASU PREOP CHECKLIST - BOWEL PREP
Patient would like g[facility]apentin [facility] go to [facility]   Will [facility] with [facility].
Pt. Would like her Gabapentin to be resent to [facility]. She states that a fax came over which she verified and all of the information was attached.     For further questions contact pt. @ [phone]
[facility]oke with [facility] and dc the script for gabapentin and resent the script to provider to go to [facility].
n/a

## 2019-11-20 ENCOUNTER — APPOINTMENT (OUTPATIENT)
Dept: BARIATRICS | Facility: CLINIC | Age: 59
End: 2019-11-20

## 2019-12-04 ENCOUNTER — APPOINTMENT (OUTPATIENT)
Dept: BARIATRICS | Facility: CLINIC | Age: 59
End: 2019-12-04
Payer: COMMERCIAL

## 2019-12-04 VITALS
TEMPERATURE: 97.1 F | SYSTOLIC BLOOD PRESSURE: 116 MMHG | HEART RATE: 71 BPM | OXYGEN SATURATION: 98 % | HEIGHT: 72 IN | WEIGHT: 230 LBS | BODY MASS INDEX: 31.15 KG/M2 | DIASTOLIC BLOOD PRESSURE: 72 MMHG

## 2019-12-04 PROCEDURE — 99213 OFFICE O/P EST LOW 20 MIN: CPT

## 2020-01-05 NOTE — PHYSICAL EXAM
[Normal Breath Sounds] : Normal breath sounds [Normal Heart Sounds] : normal heart sounds [Abdominal Masses] : No abdominal masses [Abdomen Tenderness] : ~T ~M No abdominal tenderness [de-identified] : NAD [de-identified] : soft, nontender swelling around right testicle, no swelling or fluid collection over inguinal hernia repair site, hernia repair intact

## 2020-01-05 NOTE — HISTORY OF PRESENT ILLNESS
[de-identified] : Patient states that his right testicle has increased in size and believes that the seroma that was aspirated has returned. He denies any discomfort, fever or difficulty urinating.  He is wondering if the fluid can be aspirated again.

## 2020-01-05 NOTE — ASSESSMENT
[FreeTextEntry1] : Patient was informed that the swelling is from a hydrocele and not a recurrent seroma. He was referred to a urologist for evaluation and treatment.

## 2021-08-02 ENCOUNTER — EMERGENCY (EMERGENCY)
Facility: HOSPITAL | Age: 61
LOS: 1 days | Discharge: HOME | End: 2021-08-02
Admitting: EMERGENCY MEDICINE
Payer: COMMERCIAL

## 2021-08-02 VITALS
OXYGEN SATURATION: 99 % | HEIGHT: 72 IN | WEIGHT: 214.95 LBS | TEMPERATURE: 98 F | HEART RATE: 85 BPM | DIASTOLIC BLOOD PRESSURE: 83 MMHG | SYSTOLIC BLOOD PRESSURE: 148 MMHG | RESPIRATION RATE: 18 BRPM
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
## 2021-08-02 DIAGNOSIS — I10 ESSENTIAL (PRIMARY) HYPERTENSION: ICD-10-CM

## 2021-08-02 DIAGNOSIS — M19.90 UNSPECIFIED OSTEOARTHRITIS, UNSPECIFIED SITE: ICD-10-CM

## 2021-08-02 DIAGNOSIS — M25.562 PAIN IN LEFT KNEE: ICD-10-CM

## 2021-08-02 DIAGNOSIS — Z41.9 ENCOUNTER FOR PROCEDURE FOR PURPOSES OTHER THAN REMEDYING HEALTH STATE, UNSPECIFIED: Chronic | ICD-10-CM
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
## 2021-08-02 DIAGNOSIS — Z87.19 PERSONAL HISTORY OF OTHER DISEASES OF THE DIGESTIVE SYSTEM: ICD-10-CM

## 2021-08-02 PROCEDURE — 99283 EMERGENCY DEPT VISIT LOW MDM: CPT

## 2021-08-02 PROCEDURE — 99283 EMERGENCY DEPT VISIT LOW MDM: CPT | Mod: 25

## 2021-08-02 PROCEDURE — 73562 X-RAY EXAM OF KNEE 3: CPT | Mod: 26,LT

## 2021-08-02 PROCEDURE — 73562 X-RAY EXAM OF KNEE 3: CPT

## 2021-08-02 NOTE — ED PROVIDER NOTE - PATIENT PORTAL LINK FT
You can access the FollowMyHealth Patient Portal offered by [facility] by registering at the following website: [email or web address]. By joining [employer]’s FollowMyHealth portal, you will also be able to view your health information using other applications (apps) compatible with our system.

## 2021-08-02 NOTE — ED PROVIDER NOTE - OBJECTIVE STATEMENT
61 y/o M w/ hx of HTN (occasionally takes medication) and a meniscal tear surgery 2 or 3 years ago c/o left knee pain starting 4 days ago. Pt reports there was no specific injury and believes he twisted his knee. He also reports that the pain is in the whole knee, there is no noticeable swelling, the knee does not catch or lock and that pain worsens when walking up steps.

## 2021-08-02 NOTE — ED ADULT TRIAGE NOTE - CHIEF COMPLAINT QUOTE
Patient states may have twisted left knee 6 days ago, c/o of 8/10 left knee pain, able to bear weight and ambulate w/ limping gait, did not take any pain meds.

## 2021-08-02 NOTE — ED PROVIDER NOTE - MUSCULOSKELETAL, MLM
Full range of motion, pain w/ valgus maneuver, tender to palpation of medial joint line. Negative Lachman. Ambulatory. spine appears normal

## 2021-08-02 NOTE — ED PROVIDER NOTE - NSFOLLOWUPINSTRUCTIONS_ED_ALL_ED_FT
Your x-ray did not show any broken bones. This does not evaluate soft tissue injury/inflammation.     Please take tylenol 650mg up to four times daily for pain. You may apply voltaren/diclofenac gel to knee joint per package instructions. This is available over the counter at your pharmacy.    When at home, elevate knee and apply ice pack to decrease inflammation.    Wear ace wrap for additional support and comfort.    Follow up with an orthopedist. Our referrals coordinator will help you schedule this appointment. If you have any difficulty obtaining an appointment, please call our Referrals Coordinator at [phone].    Return to the Emergency Department if you develop fever>100.4, redness, increased swelling, difficulty walking or any other concerns.

## 2021-08-10 ENCOUNTER — APPOINTMENT (OUTPATIENT)
Dept: ORTHOPEDIC SURGERY | Facility: CLINIC | Age: 61
End: 2021-08-10
Payer: COMMERCIAL

## 2021-08-10 VITALS
HEIGHT: 72 IN | OXYGEN SATURATION: 97 % | BODY MASS INDEX: 29.93 KG/M2 | WEIGHT: 221 LBS | DIASTOLIC BLOOD PRESSURE: 80 MMHG | SYSTOLIC BLOOD PRESSURE: 120 MMHG | HEART RATE: 58 BPM

## 2021-08-10 PROCEDURE — 99204 OFFICE O/P NEW MOD 45 MIN: CPT

## 2021-08-10 NOTE — PHYSICAL EXAM
[de-identified] : General appearance: well nourished and hydrated, pleasant, alert and oriented x 3, cooperative.  \par HEENT: normocephalic, EOM intact, wearing mask, external auditory canal clear.  \par Cardiovascular: no lower leg edema, no varicosities, dorsalis pedis pulses palpable and symmetric.  \par Lymphatics: no palpable lymphadenopathy, no lymphedema.  \par Neurologic: sensation is normal, no muscle weakness in upper or lower extremities, patella tendon reflexes present and symmetric.  \par Dermatologic: skin moist, warm, no rash.  \par Spine: cervical spine with normal lordosis and painless range of motion, thoracic spine with normal kyphosis and painless range of motion, lumbosacral spine with normal lordosis and painless range of motion.  No tenderness to palpation along midline spine and paraspinal musculature.  Sacroiliac joints nontender bilaterally. Negative SLR and crossed SLR tests bilaterally.\par Gait: normal.  \par \par Left knee:\par - Soft tissue swelling: minimal\par - Ecchymosis: none\par - Erythema: none\par - Effusion: trace\par - Wounds: none\par - Alignment: minimally correctable varus\par - Tenderness: mild medial joint line\par - ROM: 0-130, pain at terminal flexion\par - Collateral laxity: none\par - Cruciate laxity: none\par - Meniscal signs: positive Robbie and Eliecer.  \par - Popliteal angle (degrees): 60\par - Quad strength: 5/5 [de-identified] : Left knee XRs taken at [facility] on 8/2/21 were interpreted by me and reviewed with him. I suspect that these films were taken nonweightbearing.\par \par Left knee --\par Alignment: mild varus\par Arthritis: moderate tricompartmental worst medial, KL2\par Patellar height: normal\par Patellar tracking: central

## 2021-08-10 NOTE — DISCUSSION/SUMMARY
[de-identified] : 59y/o male with left knee osteoarthritis, likely degenerative medial meniscal tear\par - PT\par - HEP\par - Order HA left knee\par - Tylenol + Aleve as needed\par - MRI left knee per his request\par - Follow up 6 weeks, no new XRs needed

## 2021-08-10 NOTE — HISTORY OF PRESENT ILLNESS
[6] : a current pain level of 6/10 [Walking] : worsened by walking [Ice] : relieved by ice [de-identified] : 8/10/21: 59y/o male presenting for evaluation of left knee pain. Symptoms first arose around 8/1/21 after doing some light calisthenics (squats, leg lifts). Mostly anteromedial proximal tibia and joint line pain, also some posteromedial knee pain. Denies preceding left knee pain. Has not yet attempted any treatment aside from Aleve, which helps a bit. \par \par PSH includes right anterior SONU by Dr. Haro in 2018, with which he has no complaints. [de-identified] : patient describes pain as localized and throbbing

## 2021-08-21 ENCOUNTER — OUTPATIENT (OUTPATIENT)
Dept: OUTPATIENT SERVICES | Facility: HOSPITAL | Age: 61
LOS: 1 days | End: 2021-08-21
Payer: COMMERCIAL

## 2021-08-21 ENCOUNTER — APPOINTMENT (OUTPATIENT)
Dept: MRI IMAGING | Facility: HOSPITAL | Age: 61
End: 2021-08-21

## 2021-08-21 DIAGNOSIS — Z41.9 ENCOUNTER FOR PROCEDURE FOR PURPOSES OTHER THAN REMEDYING HEALTH STATE, UNSPECIFIED: Chronic | ICD-10-CM

## 2021-08-21 PROCEDURE — 73721 MRI JNT OF LWR EXTRE W/O DYE: CPT | Mod: 26,LT

## 2021-08-21 PROCEDURE — 73721 MRI JNT OF LWR EXTRE W/O DYE: CPT

## 2021-08-25 ENCOUNTER — APPOINTMENT (OUTPATIENT)
Dept: ORTHOPEDIC SURGERY | Facility: CLINIC | Age: 61
End: 2021-08-25
Payer: COMMERCIAL

## 2021-08-25 PROCEDURE — 99213 OFFICE O/P EST LOW 20 MIN: CPT | Mod: 25

## 2021-08-25 PROCEDURE — 20610 DRAIN/INJ JOINT/BURSA W/O US: CPT | Mod: LT

## 2021-08-26 NOTE — PHYSICAL EXAM
[de-identified] : General appearance: well nourished and hydrated, pleasant, alert and oriented x 3, cooperative.  \par HEENT: normocephalic, EOM intact, wearing mask, external auditory canal clear.  \par Cardiovascular: no lower leg edema, no varicosities, dorsalis pedis pulses palpable and symmetric.  \par Lymphatics: no palpable lymphadenopathy, no lymphedema.  \par Neurologic: sensation is normal, no muscle weakness in upper or lower extremities, patella tendon reflexes present and symmetric.  \par Dermatologic: skin moist, warm, no rash.  \par Spine: cervical spine with normal lordosis and painless range of motion, thoracic spine with normal kyphosis and painless range of motion, lumbosacral spine with normal lordosis and painless range of motion.  No tenderness to palpation along midline spine and paraspinal musculature.  Sacroiliac joints nontender bilaterally. Negative SLR and crossed SLR tests bilaterally.\par Gait: normal.  \par \par Left knee:\par - Soft tissue swelling: minimal\par - Ecchymosis: none\par - Erythema: none\par - Effusion: trace\par - Wounds: none\par - Alignment: minimally correctable varus\par - Tenderness: mild medial joint line\par - ROM: 0-130, pain at terminal flexion\par - Collateral laxity: none\par - Cruciate laxity: none\par - Meniscal signs: positive Robbie and Eliecer.  \par - Popliteal angle (degrees): 60\par - Quad strength: 5/5 [de-identified] : We reviewed the left knee MRI from 8/21/21. Relevant findings: complex posterior horn medial meniscal tear with associated medial and patellofemoral osteoarthritis. Partial tearing of popliteus tendon.

## 2021-08-26 NOTE — HISTORY OF PRESENT ILLNESS
[de-identified] : 8/25/21: Returning for MRI review of the left knee. Symptoms the same. Did not start any of the interventions discussed last visit.\par \par 8/10/21: 59y/o male presenting for evaluation of left knee pain. Symptoms first arose around 8/1/21 after doing some light calisthenics (squats, leg lifts). Mostly anteromedial proximal tibia and joint line pain, also some posteromedial knee pain. Denies preceding left knee pain. Has not yet attempted any treatment aside from Aleve, which helps a bit. \par \par PSH includes right anterior SONU by Dr. Haro in 2018, with which he has no complaints.

## 2021-08-26 NOTE — DISCUSSION/SUMMARY
[de-identified] : 61y/o male with left knee osteoarthritis, degenerative medial meniscal tear\par - PT\par - HEP\par - CSI left knee given today\par - Tylenol + Aleve as needed\par - RTC next week to begin Euflexxa left knee. While he seemed interested in more quickly pursuing left knee arthroscopy, I discouraged him from doing so until we have at least attempted a trial of conservative management and he acquiesced.

## 2021-08-31 ENCOUNTER — APPOINTMENT (OUTPATIENT)
Dept: ORTHOPEDIC SURGERY | Facility: CLINIC | Age: 61
End: 2021-08-31

## 2021-09-13 ENCOUNTER — APPOINTMENT (OUTPATIENT)
Dept: ORTHOPEDIC SURGERY | Facility: CLINIC | Age: 61
End: 2021-09-13

## 2021-09-20 ENCOUNTER — APPOINTMENT (OUTPATIENT)
Dept: ORTHOPEDIC SURGERY | Facility: CLINIC | Age: 61
End: 2021-09-20

## 2021-09-21 ENCOUNTER — APPOINTMENT (OUTPATIENT)
Dept: ORTHOPEDIC SURGERY | Facility: CLINIC | Age: 61
End: 2021-09-21
Payer: COMMERCIAL

## 2021-09-21 PROCEDURE — 20610 DRAIN/INJ JOINT/BURSA W/O US: CPT

## 2021-09-21 NOTE — PROCEDURE
[Injection] : Injection [Left] : of the left [Osteoarthritis] : Osteoarthritis [Knee Joint] : knee joint [Patient] : patient [Alcohol] : Alcohol [Betadine] : Betadine [Ethyl Chloride Spray] : ethyl chloride spray was used as a topical anesthetic [Lateral] : lateral [Inferior] : inferior [20] : a 20-gauge [2 mL Euflexxa___(lot #)] : 2mL Euflexxa ~Ulot# [unfilled] [Bandage Applied] : a bandage [Tolerated Well] : The patient tolerated the procedure well [None] : none [de-identified] : EUFLEXX INJECTION\par LEFT KNEE JOINT\par LOT#: [id number]\par EXP DATE: 10/18/2022\par

## 2021-09-27 ENCOUNTER — APPOINTMENT (OUTPATIENT)
Dept: ORTHOPEDIC SURGERY | Facility: CLINIC | Age: 61
End: 2021-09-27

## 2021-09-28 ENCOUNTER — APPOINTMENT (OUTPATIENT)
Dept: ORTHOPEDIC SURGERY | Facility: CLINIC | Age: 61
End: 2021-09-28
Payer: COMMERCIAL

## 2021-09-28 PROCEDURE — 20610 DRAIN/INJ JOINT/BURSA W/O US: CPT

## 2021-10-01 NOTE — PROCEDURE
[Injection] : Injection [Left] : of the left [Knee Joint] : knee joint [Osteoarthritis] : Osteoarthritis [Patient] : patient [Alcohol] : Alcohol [Betadine] : Betadine [Ethyl Chloride Spray] : ethyl chloride spray was used as a topical anesthetic [Lateral] : lateral [Inferior] : inferior [20] : a 20-gauge [2 mL Euflexxa___(lot #)] : 2mL Euflexxa ~Ulot# [unfilled] [Bandage Applied] : a bandage [Tolerated Well] : The patient tolerated the procedure well [None] : none [de-identified] : EUFLEXXA INJECTION #2 OF 3\par LEFT KNEE JOINT\par LOT#: [id number]\par EXP DATE: 10/18/2022

## 2021-10-05 ENCOUNTER — APPOINTMENT (OUTPATIENT)
Dept: ORTHOPEDIC SURGERY | Facility: CLINIC | Age: 61
End: 2021-10-05
Payer: COMMERCIAL

## 2021-10-05 PROCEDURE — 20610 DRAIN/INJ JOINT/BURSA W/O US: CPT

## 2021-10-06 PROBLEM — I10 ESSENTIAL HYPERTENSION: Status: ACTIVE | Noted: 2018-03-02

## 2021-10-06 NOTE — PROCEDURE
[Injection] : Injection [Left] : of the left [Knee Joint] : knee joint [Osteoarthritis] : Osteoarthritis [Patient] : patient [Alcohol] : Alcohol [Betadine] : Betadine [Ethyl Chloride Spray] : ethyl chloride spray was used as a topical anesthetic [Lateral] : lateral [Inferior] : inferior [20] : a 20-gauge [2 mL Euflexxa___(lot #)] : 2mL Euflexxa ~Ulot# [unfilled] [Bandage Applied] : a bandage [Tolerated Well] : The patient tolerated the procedure well [None] : none [de-identified] : EUFLEXXA INJECTION #3 OF 3\par LEFT KNEE JOINT\par LOT#: [id number]\par E3XP DATE: 10/18/2022

## 2022-04-06 ENCOUNTER — APPOINTMENT (OUTPATIENT)
Dept: SURGERY | Facility: CLINIC | Age: 62
End: 2022-04-06
Payer: COMMERCIAL

## 2022-04-06 VITALS
WEIGHT: 223 LBS | OXYGEN SATURATION: 99 % | SYSTOLIC BLOOD PRESSURE: 114 MMHG | HEIGHT: 72 IN | TEMPERATURE: 97.6 F | BODY MASS INDEX: 30.2 KG/M2 | HEART RATE: 57 BPM | DIASTOLIC BLOOD PRESSURE: 71 MMHG

## 2022-04-06 DIAGNOSIS — K40.90 UNILATERAL INGUINAL HERNIA, W/OUT OBSTRUCTION OR GANGRENE, NOT SPECIFIED AS RECURRENT: ICD-10-CM

## 2022-04-06 PROCEDURE — 99213 OFFICE O/P EST LOW 20 MIN: CPT

## 2022-04-18 ENCOUNTER — APPOINTMENT (OUTPATIENT)
Dept: SURGERY | Facility: CLINIC | Age: 62
End: 2022-04-18
Payer: COMMERCIAL

## 2022-04-18 VITALS
TEMPERATURE: 96.1 F | HEIGHT: 72 IN | DIASTOLIC BLOOD PRESSURE: 77 MMHG | BODY MASS INDEX: 30.61 KG/M2 | OXYGEN SATURATION: 100 % | SYSTOLIC BLOOD PRESSURE: 133 MMHG | WEIGHT: 226 LBS | HEART RATE: 60 BPM

## 2022-04-18 PROCEDURE — 99213 OFFICE O/P EST LOW 20 MIN: CPT

## 2022-04-18 NOTE — HISTORY OF PRESENT ILLNESS
[de-identified] : Mr. Landry was previously seen by Dr. Patricia. [de-identified] : He presented today for for evaluation and management of a left inguinal hernia.  He previously had a laparoscopic right inguinal hernia repair with mesh in 2017.  He stated the hernia has been present for approximately 3 months.  He first noticed the hernia as a bulge.  He denied significant pain of the hernia, although it is mildly uncomfortable.  He stated the hernia is enlarging.

## 2022-04-18 NOTE — ASSESSMENT
[FreeTextEntry1] : Mr. Landry is a 61-year-old man with a left inguinal hernia.  We will plan for a robotic-assisted left inguinal hernia repair with mesh on May 26, 2022.

## 2022-04-18 NOTE — CONSULT LETTER
[FreeTextEntry1] : 2022\par \par \par \par Balwinder Harris D.O.\par [facility]\par [address] [place] [address]\[place] [address]\par Telephone #: [phone]\par \par \par Re: Harley Landry\par : 1960\par \par \par Dear Dr. Harris:\par \par I had the opportunity to see Mr. Landry today for evaluation and management of a left inguinal hernia.  He previously had a laparoscopic right inguinal hernia repair with mesh in 2017.  He stated the hernia has been present for approximately 3 months.  He first noticed the hernia as a bulge.  He denied significant pain of the hernia, although it is mildly uncomfortable.  He stated the hernia is enlarging.\par \par On physical examination, his height is 6 feet, weight is 226 pounds, and BMI is 30.65.  His temperature is 96.1 °F, blood pressure is 133/77, heart rate is 60, and O2 saturation is 100% on room air.  In general, he is a well-dressed, well-nourished man who appears his stated age and is in no acute distress. He is calm, alert, and oriented x3.  HEENT exam demonstrates no scleral icterus and a normocephalic atraumatic appearance.  His abdomen has audible bowel sounds, is soft, non-tender, and non-distended.  There is no hepatosplenomegaly.  There are well-healed incisions from his prior laparoscopic surgery.  His extremities are warm and dry with no clubbing, cyanosis, or edema.  Bilateral groin examination demonstrated a left inguinal hernia that is soft but not fully reducible with Valsalva maneuver.  There is no evidence of a recurrent right inguinal hernia with Valsalva maneuver.\par \par In summary, Mr. Landry is a 61-year-old man with a left inguinal hernia.  We will plan for a robotic-assisted left inguinal hernia repair with mesh on May 26, 2022.\par \par Thank you for the opportunity to care for this patient.  Please do not hesitate to contact me in the event that you have any questions or concerns about the care of this patient.\par \par Sincerely,\par \par \par \par Kate Dee M.D.

## 2022-04-18 NOTE — PHYSICAL EXAM
[Calm] : calm [de-identified] : NAD, comfortable [de-identified] : NCAT, no scleral icterus [de-identified] : +BS soft NT ND.  No hepatosplenomegaly.  Well-healed laparoscopic incisions from prior laparoscopic inguinal hernia repair. [de-identified] : Left inguinal hernia, not fully reducible but soft.  No evidence of a recurrent right inguinal hernia with Valsalva maneuver. [de-identified] : No clubbing, cyanosis, or edema. [de-identified] : Warm, dry. [de-identified] : A&Ox3

## 2022-05-23 ENCOUNTER — LABORATORY RESULT (OUTPATIENT)
Age: 62
End: 2022-05-23

## 2022-05-25 ENCOUNTER — TRANSCRIPTION ENCOUNTER (OUTPATIENT)
Age: 62
End: 2022-05-25

## 2022-05-25 RX ORDER — CHLORHEXIDINE GLUCONATE 213 G/1000ML
1 SOLUTION TOPICAL DAILY
Refills: 0 | Status: DISCONTINUED | OUTPATIENT
Start: 2022-05-26 | End: 2022-05-26

## 2022-05-25 NOTE — ASU PATIENT PROFILE, ADULT - NSICDXPASTMEDICALHX_GEN_ALL_CORE_FT
PAST MEDICAL HISTORY:  Hernia     HTN (hypertension)     MMT (medial meniscus tear) right knee    Osteoarthritis of knee bilateral

## 2022-05-25 NOTE — ASU PATIENT PROFILE, ADULT - FALL HARM RISK - UNIVERSAL INTERVENTIONS
Bed in lowest position, wheels locked, appropriate side rails in place/Call bell, personal items and telephone in reach/Instruct patient to call for assistance before getting out of bed or chair/Non-slip footwear when patient is out of bed/[place] to call system/Physically safe environment - no spills, clutter or unnecessary equipment/Purposeful Proactive Rounding/Room/bathroom lighting operational, light cord in reach

## 2022-05-26 ENCOUNTER — APPOINTMENT (OUTPATIENT)
Dept: SURGERY | Facility: AMBULATORY SURGERY CENTER | Age: 62
End: 2022-05-26

## 2022-05-26 ENCOUNTER — TRANSCRIPTION ENCOUNTER (OUTPATIENT)
Age: 62
End: 2022-05-26

## 2022-05-26 ENCOUNTER — OUTPATIENT (OUTPATIENT)
Dept: OUTPATIENT SERVICES | Facility: HOSPITAL | Age: 62
LOS: 1 days | Discharge: HOME | End: 2022-05-26
Payer: COMMERCIAL

## 2022-05-26 VITALS
WEIGHT: 221.12 LBS | HEIGHT: 72 IN | RESPIRATION RATE: 16 BRPM | TEMPERATURE: 98 F | OXYGEN SATURATION: 99 % | HEART RATE: 54 BPM

## 2022-05-26 VITALS
OXYGEN SATURATION: 99 % | HEART RATE: 62 BPM | RESPIRATION RATE: 18 BRPM | DIASTOLIC BLOOD PRESSURE: 71 MMHG | SYSTOLIC BLOOD PRESSURE: 132 MMHG

## 2022-05-26 DIAGNOSIS — Z41.9 ENCOUNTER FOR PROCEDURE FOR PURPOSES OTHER THAN REMEDYING HEALTH STATE, UNSPECIFIED: Chronic | ICD-10-CM

## 2022-05-26 PROCEDURE — S2900 ROBOTIC SURGICAL SYSTEM: CPT

## 2022-05-26 PROCEDURE — 49585: CPT | Mod: 59

## 2022-05-26 PROCEDURE — S2900 ROBOTIC SURGICAL SYSTEM: CPT | Mod: NC,AS

## 2022-05-26 PROCEDURE — 49650 LAP ING HERNIA REPAIR INIT: CPT | Mod: LT

## 2022-05-26 PROCEDURE — 49585: CPT | Mod: AS,59

## 2022-05-26 PROCEDURE — 49650 LAP ING HERNIA REPAIR INIT: CPT | Mod: AS

## 2022-05-26 DEVICE — MESH HERNIA INGUINAL 3DMAX EXTRA LARGE 5 X 7" LEFT: Type: IMPLANTABLE DEVICE | Site: LEFT | Status: FUNCTIONAL

## 2022-05-26 RX ORDER — ONDANSETRON 8 MG/1
4 TABLET, FILM COATED ORAL ONCE
Refills: 0 | Status: DISCONTINUED | OUTPATIENT
Start: 2022-05-26 | End: 2022-05-26

## 2022-05-26 RX ORDER — BISOPROLOL FUMARATE 10 MG/1
1 TABLET, FILM COATED ORAL
Qty: 0 | Refills: 0 | DISCHARGE

## 2022-05-26 RX ORDER — SODIUM CHLORIDE 9 MG/ML
1000 INJECTION, SOLUTION INTRAVENOUS
Refills: 0 | Status: DISCONTINUED | OUTPATIENT
Start: 2022-05-26 | End: 2022-05-26

## 2022-05-26 RX ORDER — DOCUSATE SODIUM 100 MG
1 CAPSULE ORAL
Qty: 20 | Refills: 0
Start: 2022-05-26

## 2022-05-26 RX ORDER — OMEPRAZOLE 10 MG/1
1 CAPSULE, DELAYED RELEASE ORAL
Qty: 0 | Refills: 0 | DISCHARGE

## 2022-05-26 RX ORDER — ACETAMINOPHEN 500 MG
1000 TABLET ORAL ONCE
Refills: 0 | Status: COMPLETED | OUTPATIENT
Start: 2022-05-26 | End: 2022-05-26

## 2022-05-26 RX ORDER — FENTANYL CITRATE 50 UG/ML
25 INJECTION INTRAVENOUS
Refills: 0 | Status: DISCONTINUED | OUTPATIENT
Start: 2022-05-26 | End: 2022-05-26

## 2022-05-26 RX ORDER — OXYCODONE HYDROCHLORIDE 5 MG/1
1 TABLET ORAL
Qty: 5 | Refills: 0
Start: 2022-05-26

## 2022-05-26 RX ADMIN — CHLORHEXIDINE GLUCONATE 1 APPLICATION(S): 213 SOLUTION TOPICAL at 06:26

## 2022-05-26 RX ADMIN — Medication 1000 MILLIGRAM(S): at 06:23

## 2022-05-26 NOTE — ASU DISCHARGE PLAN (ADULT/PEDIATRIC) - NS MD DC FALL RISK RISK
For information on Fall & Injury Prevention, visit: [email or web address] OR  [email or web address] OR  https://www.cdc.gov/steadi/patient.html

## 2022-05-31 NOTE — BRIEF OPERATIVE NOTE - NSICDXBRIEFPREOP_GEN_ALL_CORE_FT
PRE-OP DIAGNOSIS:  Left inguinal hernia 31-May-2022 12:07:33  Sydney Lyon  Umbilical hernia 31-May-2022 12:07:40  Sydney Lyon

## 2022-05-31 NOTE — BRIEF OPERATIVE NOTE - NSICDXBRIEFPROCEDURE_GEN_ALL_CORE_FT
PROCEDURES:  Robot-assisted repair of inguinal hernia 31-May-2022 12:07:08  Sydney Lyon  Repair, hernia, umbilical, adult 31-May-2022 12:07:19  Sydney Lyon

## 2022-05-31 NOTE — BRIEF OPERATIVE NOTE - OPERATION/FINDINGS
Pt placed supine on operating table. General anesthesia provided by anesthesia team. SCD applied. Abdomen prepped with chlorhexidine and draped in usual sterile fashion. Local anesthesia given, incision made with #11 blade. Yahaira cannula introduced into abdominal cavity, pneumoperitoneum established. Robotic camera used to examine abdominal cavity. Two 8mm trocars placed under direct visualization. Robot docked. Left inguinal hernia identified and dissected. Mesh placed, peritoneum closed with 0 quill suture. Hemostasis confirmed. Robot undocked, 8mm trocars removed under direct visualization. Yahaira removed. Fascia closed with 0 maxon. Umbilical hernia repaired. Skin closed with 4-0 Monocryl. Mastisol, steri strips and band aids applied to incisions.

## 2022-05-31 NOTE — BRIEF OPERATIVE NOTE - NSICDXBRIEFPOSTOP_GEN_ALL_CORE_FT
POST-OP DIAGNOSIS:  Left inguinal hernia 31-May-2022 12:07:54  Sydney Lyon  Umbilical hernia 31-May-2022 12:08:03  Sydney Lyon

## 2022-06-06 ENCOUNTER — APPOINTMENT (OUTPATIENT)
Dept: SURGERY | Facility: CLINIC | Age: 62
End: 2022-06-06
Payer: COMMERCIAL

## 2022-06-06 VITALS
BODY MASS INDEX: 30.61 KG/M2 | SYSTOLIC BLOOD PRESSURE: 119 MMHG | HEART RATE: 76 BPM | HEIGHT: 72 IN | OXYGEN SATURATION: 100 % | TEMPERATURE: 95.9 F | DIASTOLIC BLOOD PRESSURE: 77 MMHG | WEIGHT: 226 LBS

## 2022-06-06 DIAGNOSIS — K42.9 UMBILICAL HERNIA W/OUT OBSTRUCTION OR GANGRENE: ICD-10-CM

## 2022-06-06 DIAGNOSIS — Z87.19 PERSONAL HISTORY OF OTHER DISEASES OF THE DIGESTIVE SYSTEM: ICD-10-CM

## 2022-06-06 DIAGNOSIS — S83.249A OTHER TEAR OF MEDIAL MENISCUS, CURRENT INJURY, UNSPECIFIED KNEE, INITIAL ENCOUNTER: ICD-10-CM

## 2022-06-06 DIAGNOSIS — N99.843 POSTPROCEDURAL SEROMA OF A GENITOURINARY SYSTEM ORGAN OR STRUCTURE FOLLOWING OTHER PROCEDURE: ICD-10-CM

## 2022-06-06 DIAGNOSIS — N43.3 HYDROCELE, UNSPECIFIED: ICD-10-CM

## 2022-06-06 DIAGNOSIS — Z86.79 PERSONAL HISTORY OF OTHER DISEASES OF THE CIRCULATORY SYSTEM: ICD-10-CM

## 2022-06-06 DIAGNOSIS — Z78.9 OTHER SPECIFIED HEALTH STATUS: ICD-10-CM

## 2022-06-06 PROCEDURE — 99024 POSTOP FOLLOW-UP VISIT: CPT

## 2022-06-06 NOTE — REASON FOR VISIT
[Post Op: _________] : a [unfilled] post op visit [FreeTextEntry1] : He underwent a robotic-assisted left inguinal hernia repair with mesh and umbilical hernia repair on May 26, 2022.

## 2022-06-06 NOTE — HISTORY OF PRESENT ILLNESS
[de-identified] : Mr. Landry presented previously for for evaluation and management of a left inguinal hernia.  He previously had a laparoscopic right inguinal hernia repair with mesh in 2017.  He stated the hernia has been present for approximately 3 months.  He first noticed the hernia as a bulge.  He denied significant pain of the hernia, although it is mildly uncomfortable.  He stated the hernia is enlarging.  He underwent a robotic-assisted left inguinal hernia repair with mesh and umbilical hernia repair on May 26, 2022. [de-identified] : He presented today for a routine post-operative visit.  He is overall feeling well.  He denied fever, chills, nausea, vomiting, diarrhea, or constipation.  He noted swelling of the left side of the scrotum.

## 2022-06-06 NOTE — PHYSICAL EXAM
[Calm] : calm [de-identified] : NAD, comfortable [de-identified] : NCAT, no scleral icterus [de-identified] : soft NT ND.  Incisions healing well without erythema or induration.  No evidence of a recurrent umbilical hernia or any incisional hernias on Valsalva maneuver. [de-identified] : Bilateral groin examination demonstrates no evidence of a recurrent inguinal hernia bilaterally with Valsalva maneuver.  Left groin seroma. [de-identified] : No clubbing, cyanosis, or edema. [de-identified] : Warm, dry. [de-identified] : A&Ox3

## 2022-06-06 NOTE — CONSULT LETTER
[FreeTextEntry1] : 2022\par \par \par \par Balwinder Harris D.O.\par [facility]\par [address] [place] [address]\[place] [address]\par Telephone #: [phone]\par \par \par Re: Harley Landry\par : 1960\par \par \par Dear Dr. Harris:\par \par I had the opportunity to see Mr. Landry today for a routine post-operative visit after he underwent a robotic-assisted left inguinal hernia repair with mesh and umbilical hernia repair on May 26, 2022.  He is overall feeling well.  He denied fever, chills, nausea, vomiting, diarrhea, or constipation.  He noted swelling of the left side of the scrotum.\par \par On physical examination, his height is 6 feet, weight is 226 pounds, and BMI is 30.65.  His temperature is 95.9 °F, blood pressure is 119/77, heart rate is 76, and O2 saturation is 100% on room air.  In general, he is a well-dressed, well-nourished man who appears his stated age and is in no acute distress. He is calm, alert, and oriented x3.  HEENT exam demonstrates no scleral icterus and a normocephalic atraumatic appearance.  His abdomen is soft, non-tender, and non-distended.  The incisions are healing well without erythema or induration.  There is no evidence of a recurrent umbilical hernia or any incisional hernias with Valsalva maneuver.  His extremities are warm and dry with no clubbing, cyanosis, or edema.  Bilateral groin examination demonstrated no evidence of a recurrent inguinal hernia bilaterally with Valsalva maneuver.  There is a left groin seroma, as expected.\par \par In summary, Mr. Landry is a 61-year-old man who underwent a robotic-assisted left inguinal hernia repair with mesh and umbilical hernia repair on May 26, 2022.  He has a left groin seroma, as expected.  He is recovering as expected and will follow up with me as needed.\par \par Thank you for the opportunity to care for this patient.  Please do not hesitate to contact me in the event that you have any questions or concerns about the care of this patient.\par \par Sincerely,\par \par \par \par Kate Dee M.D.

## 2022-06-06 NOTE — ASSESSMENT
[FreeTextEntry1] : Mr. Landry is a 61-year-old man who underwent a robotic-assisted left inguinal hernia repair with mesh and umbilical hernia repair on May 26, 2022.  He has a left groin seroma, as expected.  He is recovering as expected and will follow up with me as needed.

## 2022-06-29 PROBLEM — K40.90 LEFT INGUINAL HERNIA: Status: ACTIVE | Noted: 2022-04-18

## 2022-06-29 NOTE — HISTORY OF PRESENT ILLNESS
[de-identified] : Patient is her for evaluation of a possible left inguinal hernia. He states that he noticed a small bulge in his left groin a few months ago. He denies pain or GI obstructive symptoms but has noticed that it is getting bigger. He had a robotic-assisted right inguinal hernia repair with mesh and an umbilical hernia repair by me in 2017.

## 2022-06-29 NOTE — PHYSICAL EXAM
[Abdominal Masses] : No abdominal masses [Abdomen Tenderness] : ~T ~M No abdominal tenderness [Calm] : calm [de-identified] : NAD [de-identified] : small spontaneously reducible left inguinal hernia

## 2022-06-29 NOTE — ASSESSMENT
[FreeTextEntry1] : He was referred to Dr Dee for a laparoscopic robotic assisted left inguinal hernia repair. i will not be able to perform the operation since i will soon be leaving the practice.

## 2022-07-05 NOTE — END OF VISIT
[>50% of Time Spent on Counseling for ____] : Greater than 50% of the encounter time was spent on counseling for [unfilled] [Time Spent: ___ minutes] : I have spent [unfilled] minutes of face to face time with the patient Ambulatory

## 2022-07-20 ENCOUNTER — OUTPATIENT (OUTPATIENT)
Dept: OUTPATIENT SERVICES | Facility: HOSPITAL | Age: 62
LOS: 1 days | End: 2022-07-20
Payer: COMMERCIAL

## 2022-07-20 ENCOUNTER — RESULT REVIEW (OUTPATIENT)
Age: 62
End: 2022-07-20

## 2022-07-20 ENCOUNTER — APPOINTMENT (OUTPATIENT)
Dept: ORTHOPEDIC SURGERY | Facility: CLINIC | Age: 62
End: 2022-07-20

## 2022-07-20 VITALS
SYSTOLIC BLOOD PRESSURE: 138 MMHG | BODY MASS INDEX: 29.12 KG/M2 | DIASTOLIC BLOOD PRESSURE: 75 MMHG | HEIGHT: 72 IN | WEIGHT: 215 LBS

## 2022-07-20 DIAGNOSIS — Z41.9 ENCOUNTER FOR PROCEDURE FOR PURPOSES OTHER THAN REMEDYING HEALTH STATE, UNSPECIFIED: Chronic | ICD-10-CM

## 2022-07-20 PROCEDURE — 73564 X-RAY EXAM KNEE 4 OR MORE: CPT

## 2022-07-20 PROCEDURE — 73564 X-RAY EXAM KNEE 4 OR MORE: CPT | Mod: 26,LT

## 2022-07-20 PROCEDURE — 20610 DRAIN/INJ JOINT/BURSA W/O US: CPT | Mod: LT

## 2022-07-20 PROCEDURE — 99214 OFFICE O/P EST MOD 30 MIN: CPT | Mod: 25

## 2022-07-24 NOTE — DISCUSSION/SUMMARY
[de-identified] : 60y/o male with left knee osteoarthritis, degenerative medial meniscal tear\par - Left knee CSI administered today\par - PT\par - HEP\par - Tylenol + Aleve as needed\par - HA authorization for the left knee\par - RTC q3-6mo as needed for further injections

## 2022-07-24 NOTE — PHYSICAL EXAM
[de-identified] : General appearance: well nourished and hydrated, pleasant, alert and oriented x 3, cooperative.  \par HEENT: normocephalic, EOM intact, wearing mask, external auditory canal clear.  \par Cardiovascular: no lower leg edema, no varicosities, dorsalis pedis pulses palpable and symmetric.  \par Lymphatics: no palpable lymphadenopathy, no lymphedema.  \par Neurologic: sensation is normal, no muscle weakness in upper or lower extremities, patella tendon reflexes present and symmetric.  \par Dermatologic: skin moist, warm, no rash.  \par Spine: cervical spine with normal lordosis and painless range of motion, thoracic spine with normal kyphosis and painless range of motion, lumbosacral spine with normal lordosis and painless range of motion.  No tenderness to palpation along midline spine and paraspinal musculature.  Sacroiliac joints nontender bilaterally. Negative SLR and crossed SLR tests bilaterally.\par Gait: normal.  \par \par Left knee:\par - Soft tissue swelling: minimal\par - Ecchymosis: none\par - Erythema: none\par - Effusion: trace\par - Wounds: none\par - Alignment: marked non correctable varus\par - Tenderness: medial joint line\par - ROM: 0-135,\par - Collateral laxity: none\par - Cruciate laxity: none\par - Meniscal signs: positive Robbie and Eliecer.  \par - Popliteal angle (degrees): 60\par - Quad strength: 5/5 [de-identified] : 4 views of the left knee (weightbearing AP, weightbearing Weber, weightbearing lateral, and Sunrise) were interpreted by me and reviewed with the patient.\par \par Location of imaging: [facility]\par Date of exam: 7/20/22\par \par Left knee -- \par Alignment: varus\par Arthritis: tricompartmental worst medial, KL3\par Patellar height: normal\par Patellar tracking: central

## 2022-07-24 NOTE — PROCEDURE
[de-identified] : Procedure: Knee joint injection\par Laterality: left\par Indication: Osteoarthritis - discussed with patient\par Skin prep: alcohol and chlorhexidine\par Anesthesia: ethyl chloride spray\par Needle: 20-gauge\par Portal: inferolateral\par Aspiration: none\par Injectate: 2cc of 2% lidocaine, 2cc of 0.5% bupivacaine, and 1cc of 40mg/mL triamcinolone\par Dressing: Band-aid\par Complications: None\par

## 2022-07-24 NOTE — HISTORY OF PRESENT ILLNESS
[de-identified] : 7/20/22: 60 y/o male presents for followup of left knee osteoarthritis. States his pain has been tolerable. Reports stationary bicycling as his main form of exercise. He states previous CSI provided a good amount of relief and would like to continue with left knee injections today.\par \par 8/25/21: Returning for MRI review of the left knee. Symptoms the same. Did not start any of the interventions discussed last visit.\par \par 8/10/21: 61y/o male presenting for evaluation of left knee pain. Symptoms first arose around 8/1/21 after doing some light calisthenics (squats, leg lifts). Mostly anteromedial proximal tibia and joint line pain, also some posteromedial knee pain. Denies preceding left knee pain. Has not yet attempted any treatment aside from Aleve, which helps a bit. \par \par PSH includes right anterior SONU by Dr. Haro in 2018, with which he has no complaints.

## 2022-08-17 RX ORDER — HYALURONATE SODIUM 20 MG/2 ML
20 SYRINGE (ML) INTRAARTICULAR
Qty: 1 | Refills: 0 | Status: COMPLETED | OUTPATIENT
Start: 2021-08-10 | End: 2021-10-05

## 2022-08-24 ENCOUNTER — APPOINTMENT (OUTPATIENT)
Dept: ORTHOPEDIC SURGERY | Facility: CLINIC | Age: 62
End: 2022-08-24

## 2022-08-24 VITALS
HEIGHT: 72 IN | SYSTOLIC BLOOD PRESSURE: 129 MMHG | WEIGHT: 215 LBS | OXYGEN SATURATION: 99 % | HEART RATE: 67 BPM | DIASTOLIC BLOOD PRESSURE: 78 MMHG | BODY MASS INDEX: 29.12 KG/M2

## 2022-08-24 PROCEDURE — 20610 DRAIN/INJ JOINT/BURSA W/O US: CPT

## 2022-08-29 NOTE — PROCEDURE
[de-identified] : euflexxa injection - left knee joint \par lot-[id number]\par EXP- 2023-08-20\par NDC-[address]-8\par MAN - FERRING \par \par Procedure: Knee joint injection\par Laterality: left\par Indication: Osteoarthritis - discussed with patient\par Skin prep: alcohol and chlorhexidine\par Anesthesia: ethyl chloride spray\par Needle: 20-gauge\par Portal: inferolateral\par Aspiration: none\par Injectate: Euflexxa 1/3\par Dressing: Band-aid\par Complications: None\par \par RTC in 1 week to continue course of injections

## 2022-08-31 ENCOUNTER — APPOINTMENT (OUTPATIENT)
Dept: ORTHOPEDIC SURGERY | Facility: CLINIC | Age: 62
End: 2022-08-31

## 2022-08-31 VITALS
OXYGEN SATURATION: 100 % | SYSTOLIC BLOOD PRESSURE: 128 MMHG | WEIGHT: 215 LBS | HEIGHT: 72 IN | DIASTOLIC BLOOD PRESSURE: 82 MMHG | BODY MASS INDEX: 29.12 KG/M2 | HEART RATE: 67 BPM

## 2022-08-31 PROCEDURE — 20610 DRAIN/INJ JOINT/BURSA W/O US: CPT

## 2022-09-01 NOTE — PROCEDURE
[de-identified] : \par EUFLEXXA INJECTION - LEFT KNEE JOINT \par EXP 08/20/2023\par LOT - [id number]\par MAN-ADRIEN \par NDC- [address]-9\par \par \par Procedure: Knee joint injection\par Laterality: left\par Indication: Osteoarthritis - discussed with patient\par Skin prep: alcohol and chlorhexidine\par Anesthesia: ethyl chloride spray\par Needle: 20-gauge\par Portal: inferolateral\par Aspiration: none\par Injectate: Euflexxa 2/3\par Dressing: Band-aid\par Complications: None\par \par RTC in 1 week to repeat course of injections\par

## 2022-09-07 ENCOUNTER — APPOINTMENT (OUTPATIENT)
Dept: ORTHOPEDIC SURGERY | Facility: CLINIC | Age: 62
End: 2022-09-07

## 2022-09-07 ENCOUNTER — RESULT REVIEW (OUTPATIENT)
Age: 62
End: 2022-09-07

## 2022-09-07 ENCOUNTER — OUTPATIENT (OUTPATIENT)
Dept: OUTPATIENT SERVICES | Facility: HOSPITAL | Age: 62
LOS: 1 days | End: 2022-09-07
Payer: COMMERCIAL

## 2022-09-07 VITALS
DIASTOLIC BLOOD PRESSURE: 78 MMHG | OXYGEN SATURATION: 98 % | HEIGHT: 75 IN | HEART RATE: 60 BPM | SYSTOLIC BLOOD PRESSURE: 120 MMHG | WEIGHT: 215 LBS | BODY MASS INDEX: 26.73 KG/M2

## 2022-09-07 DIAGNOSIS — M17.12 UNILATERAL PRIMARY OSTEOARTHRITIS, LEFT KNEE: ICD-10-CM

## 2022-09-07 DIAGNOSIS — Z41.9 ENCOUNTER FOR PROCEDURE FOR PURPOSES OTHER THAN REMEDYING HEALTH STATE, UNSPECIFIED: Chronic | ICD-10-CM

## 2022-09-07 DIAGNOSIS — Z96.641 AFTERCARE FOLLOWING JOINT REPLACEMENT SURGERY: ICD-10-CM

## 2022-09-07 DIAGNOSIS — M17.11 UNILATERAL PRIMARY OSTEOARTHRITIS, RIGHT KNEE: ICD-10-CM

## 2022-09-07 DIAGNOSIS — Z47.1 AFTERCARE FOLLOWING JOINT REPLACEMENT SURGERY: ICD-10-CM

## 2022-09-07 DIAGNOSIS — M16.11 UNILATERAL PRIMARY OSTEOARTHRITIS, RIGHT HIP: ICD-10-CM

## 2022-09-07 PROCEDURE — 20610 DRAIN/INJ JOINT/BURSA W/O US: CPT

## 2022-09-07 PROCEDURE — 73564 X-RAY EXAM KNEE 4 OR MORE: CPT | Mod: 26,RT

## 2022-09-07 PROCEDURE — 73564 X-RAY EXAM KNEE 4 OR MORE: CPT

## 2022-09-08 PROBLEM — Z47.1 AFTERCARE FOLLOWING RIGHT HIP JOINT REPLACEMENT SURGERY: Status: ACTIVE | Noted: 2018-03-12

## 2022-09-08 NOTE — PROCEDURE
[de-identified] : Following up to continue left knee Euflexxa series. He notes that the left knee is virtually painless now but that the right knee has been starting to bother him.\par \par EUFLEXXA INJECTION - LEFT KNEE JOINT \par EXP - 2023-08-20\par LOT- [id number] \par  NDC- [address]-7\par MAN ADRIEN \par \par Procedure: Knee joint injection\par Laterality: left\par Indication: Osteoarthritis - discussed with patient\par Skin prep: alcohol and chlorhexidine\par Anesthesia: ethyl chloride spray\par Needle: 20-gauge\par Portal: inferolateral\par Aspiration: none\par Injectate: Euflexxa\par Dressing: Band-aid\par Complications: None\par \par 4 views of the right knee (weightbearing AP, weightbearing Weber, weightbearing lateral, and Sunrise) were interpreted by me and reviewed with the patient.\par \par Location of imaging: [facility]\par Date of exam: 9/7/22\par \par Right knee -- \par Alignment: mild varus\par Arthritis: tricompartmental worst medial, KL2\par Patellar height: normal\par Patellar tracking: central\par \par Order R knee Euflexxa. Cont serial injections as needed

## 2022-09-21 ENCOUNTER — APPOINTMENT (OUTPATIENT)
Dept: ORTHOPEDIC SURGERY | Facility: CLINIC | Age: 62
End: 2022-09-21

## 2022-09-21 VITALS
HEIGHT: 75 IN | SYSTOLIC BLOOD PRESSURE: 117 MMHG | WEIGHT: 215 LBS | BODY MASS INDEX: 26.73 KG/M2 | DIASTOLIC BLOOD PRESSURE: 74 MMHG | HEART RATE: 87 BPM | OXYGEN SATURATION: 96 %

## 2022-09-21 PROCEDURE — 20610 DRAIN/INJ JOINT/BURSA W/O US: CPT | Mod: RT

## 2022-09-28 NOTE — PROCEDURE
[de-identified] : \par Procedure: Knee joint injection\par Laterality: right\par Indication: Osteoarthritis - discussed with patient\par Skin prep: alcohol and chlorhexidine\par Anesthesia: ethyl chloride spray\par Needle: 20-gauge\par Portal: inferolateral\par Aspiration: none\par Injectate: Euflexxa 1/3\par Dressing: Band-aid\par Complications: None\par  \par EUFLEXXA INJECTION - RIGHT KNEE JOINT \par EXP -09/19/2022\par LOT - [id number]\par MAN - FERRING \par NDC- [address]-1

## 2022-09-30 ENCOUNTER — APPOINTMENT (OUTPATIENT)
Dept: ORTHOPEDIC SURGERY | Facility: CLINIC | Age: 62
End: 2022-09-30

## 2022-09-30 VITALS
WEIGHT: 215 LBS | HEIGHT: 75 IN | HEART RATE: 68 BPM | DIASTOLIC BLOOD PRESSURE: 74 MMHG | SYSTOLIC BLOOD PRESSURE: 113 MMHG | BODY MASS INDEX: 26.73 KG/M2 | OXYGEN SATURATION: 99 %

## 2022-09-30 PROCEDURE — 20610 DRAIN/INJ JOINT/BURSA W/O US: CPT

## 2022-10-01 NOTE — PROCEDURE
[de-identified] : EUFLEXXA INJECTION - RIGHT KNEE JOINT \par EXP -2023-09-19\par LOT- [id number] \par  MAN - FERRING \par NDC - [address]-8\par \par Procedure: Knee joint injection\par Laterality: right\par Indication: Osteoarthritis - discussed with patient\par Skin prep: alcohol and chlorhexidine\par Anesthesia: ethyl chloride spray\par Needle: 20-gauge\par Portal: inferolateral\par Aspiration: none\par Injectate: Euflexxa 2/3\par Dressing: Band-aid\par Complications: None\par \par RTC next week to complete the series

## 2022-10-05 ENCOUNTER — APPOINTMENT (OUTPATIENT)
Dept: ORTHOPEDIC SURGERY | Facility: CLINIC | Age: 62
End: 2022-10-05

## 2022-10-05 VITALS
OXYGEN SATURATION: 98 % | DIASTOLIC BLOOD PRESSURE: 72 MMHG | HEIGHT: 75 IN | HEART RATE: 68 BPM | WEIGHT: 215 LBS | SYSTOLIC BLOOD PRESSURE: 112 MMHG | BODY MASS INDEX: 26.73 KG/M2

## 2022-10-05 PROCEDURE — 20610 DRAIN/INJ JOINT/BURSA W/O US: CPT

## 2022-10-06 NOTE — PROCEDURE
[de-identified] :  EUFLEXXA INJECTION - RIGHT KNEE JOINT \par EXP - 2023-09-19\par LOT [id number] \par MAN FERRING \par NDC - [address]-3\par \par Procedure: Knee joint injection\par Laterality: right\par Indication: Osteoarthritis - discussed with patient\par Skin prep: alcohol and chlorhexidine\par Anesthesia: ethyl chloride spray\par Needle: 20-gauge\par Portal: inferolateral\par Aspiration: none\par Injectate: Euflexxa 3/3\par Dressing: Band-aid\par Complications: None\par

## 2022-10-21 ENCOUNTER — APPOINTMENT (OUTPATIENT)
Dept: ORTHOPEDIC SURGERY | Facility: CLINIC | Age: 62
End: 2022-10-21

## 2022-12-07 NOTE — ASU DISCHARGE PLAN (ADULT/PEDIATRIC) - BATHING
Received report from pm  RN. Patient alert and oriented times four. Family at bedside. Pt reevaluated and discharged. IV lock dc'd.   UMA Sotomayor
Shower only/Do not submerge in water

## 2023-07-18 NOTE — ASU PATIENT PROFILE, ADULT - NS PRO PT RIGHT SUPPORT PERSON
Declines Rinvoq Counseling: I discussed with the patient the risks of Rinvoq therapy including but not limited to upper respiratory tract infections, shingles, cold sores, bronchitis, nausea, cough, fever, acne, and headache. Live vaccines should be avoided.  This medication has been linked to serious infections; higher rate of mortality; malignancy and lymphoproliferative disorders; major adverse cardiovascular events; thrombosis; thrombocytopenia, anemia, and neutropenia; lipid elevations; liver enzyme elevations; and gastrointestinal perforations.

## 2023-08-25 ENCOUNTER — APPOINTMENT (OUTPATIENT)
Dept: PULMONOLOGY | Facility: CLINIC | Age: 63
End: 2023-08-25

## 2023-08-25 ENCOUNTER — OUTPATIENT (OUTPATIENT)
Dept: OUTPATIENT SERVICES | Facility: HOSPITAL | Age: 63
LOS: 1 days | End: 2023-08-25
Payer: COMMERCIAL

## 2023-08-25 ENCOUNTER — APPOINTMENT (OUTPATIENT)
Dept: PULMONOLOGY | Facility: CLINIC | Age: 63
End: 2023-08-25
Payer: COMMERCIAL

## 2023-08-25 VITALS
DIASTOLIC BLOOD PRESSURE: 70 MMHG | BODY MASS INDEX: 28.99 KG/M2 | SYSTOLIC BLOOD PRESSURE: 106 MMHG | WEIGHT: 214 LBS | OXYGEN SATURATION: 98 % | HEART RATE: 63 BPM | HEIGHT: 72 IN | TEMPERATURE: 97.3 F

## 2023-08-25 DIAGNOSIS — Z57.9 OCCUPATIONAL EXPOSURE TO UNSPECIFIED RISK FACTOR: ICD-10-CM

## 2023-08-25 DIAGNOSIS — Z41.9 ENCOUNTER FOR PROCEDURE FOR PURPOSES OTHER THAN REMEDYING HEALTH STATE, UNSPECIFIED: Chronic | ICD-10-CM

## 2023-08-25 PROCEDURE — 99204 OFFICE O/P NEW MOD 45 MIN: CPT

## 2023-08-25 PROCEDURE — 71046 X-RAY EXAM CHEST 2 VIEWS: CPT

## 2023-08-25 PROCEDURE — 71046 X-RAY EXAM CHEST 2 VIEWS: CPT | Mod: 26

## 2023-08-25 RX ORDER — HYALURONATE SODIUM 20 MG/2 ML
20 SYRINGE (ML) INTRAARTICULAR
Qty: 1 | Refills: 0 | Status: DISCONTINUED | OUTPATIENT
Start: 2022-07-20 | End: 2023-08-25

## 2023-08-25 RX ORDER — TADALAFIL 10 MG/1
10 TABLET, FILM COATED ORAL
Qty: 10 | Refills: 0 | Status: DISCONTINUED | COMMUNITY
Start: 2017-09-25 | End: 2023-08-25

## 2023-08-25 RX ORDER — HYALURONATE SODIUM 20 MG/2 ML
20 SYRINGE (ML) INTRAARTICULAR
Qty: 1 | Refills: 0 | Status: DISCONTINUED | OUTPATIENT
Start: 2022-09-07 | End: 2023-08-25

## 2023-08-25 SDOH — HEALTH STABILITY - PHYSICAL HEALTH: OCCUPATIONAL EXPOSURE TO UNSPECIFIED RISK FACTOR: Z57.9

## 2023-08-28 DIAGNOSIS — R93.89 ABNORMAL FINDINGS ON DIAGNOSTIC IMAGING OF OTHER SPECIFIED BODY STRUCTURES: ICD-10-CM

## 2023-08-29 ENCOUNTER — APPOINTMENT (OUTPATIENT)
Dept: PULMONOLOGY | Facility: CLINIC | Age: 63
End: 2023-08-29
Payer: COMMERCIAL

## 2023-08-29 PROCEDURE — 94729 DIFFUSING CAPACITY: CPT

## 2023-08-29 PROCEDURE — 94727 GAS DIL/WSHOT DETER LNG VOL: CPT

## 2023-08-29 PROCEDURE — 94060 EVALUATION OF WHEEZING: CPT

## 2023-09-02 ENCOUNTER — OUTPATIENT (OUTPATIENT)
Dept: OUTPATIENT SERVICES | Facility: HOSPITAL | Age: 63
LOS: 1 days | End: 2023-09-02
Payer: COMMERCIAL

## 2023-09-02 ENCOUNTER — APPOINTMENT (OUTPATIENT)
Dept: CT IMAGING | Facility: HOSPITAL | Age: 63
End: 2023-09-02

## 2023-09-02 DIAGNOSIS — Z41.9 ENCOUNTER FOR PROCEDURE FOR PURPOSES OTHER THAN REMEDYING HEALTH STATE, UNSPECIFIED: Chronic | ICD-10-CM

## 2023-09-02 PROCEDURE — 71250 CT THORAX DX C-: CPT

## 2023-09-02 PROCEDURE — 71250 CT THORAX DX C-: CPT | Mod: 26

## 2023-09-07 NOTE — HISTORY OF PRESENT ILLNESS
[TextBox_4] : 08/25/2023: Asked to evaluate patient by Jim Townsend for workplace exposure. Here w his grandson.  for [employer] for past 20 years. 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 He has no cough or dyspnea. No history of lung disease. Never was a smoker. Grew up in [facility]. [ESS] : 2

## 2023-09-07 NOTE — ASSESSMENT
[FreeTextEntry1] : Data reviewed:  PA/lat CXR R 6/2022 personally reviewed: clear w granulomas  Impression: Occupational exposure Asymptomatic Covid non immune  Plan: We will check a CXR and PFT for baseline and to exclude subclinical disease. He has not had Covid nor any Covid vaccine; urged vaccination. -- PA/lat CXR [facility] 8/2023 personally reviewed: inc markings, will get HRCT -- PFT 8/29/2023: normal, FVC 4.93l (116%), FEV1 3.67L (112%), TLC 6.55L (87%), DLCO 23.05 (81%) -- CT chest [facility] 9/2023 personally reviewed : minimal abnormalities, no sig ILD Bilateral lung air trapping which could be related to asthma, constrictive bronchiolitis etc. No evidence of emphysema. No significant interstitial lung disease. Subpleural bilateral lower lobe and posterior segment right upper lobe nodular calcifications.

## 2023-09-07 NOTE — CONSULT LETTER
[Dear  ___] : Dear ~CRISTOFER, [( Thank you for referring [unfilled] for consultation for _____ )] : Thank you for referring [unfilled] for consultation for [unfilled] [Please see my note below.] : Please see my note below. [Consult Closing:] : Thank you very much for allowing me to participate in the care of this patient.  If you have any questions, please do not hesitate to contact me. [Sincerely,] : Sincerely, [FreeTextEntry2] : MARIS Goddard [address] [place] [address] [FreeTextEntry3] : Aundrea Wall MD, [facility]

## 2023-10-11 ENCOUNTER — APPOINTMENT (OUTPATIENT)
Dept: ORTHOPEDIC SURGERY | Facility: CLINIC | Age: 63
End: 2023-10-11

## 2024-06-17 ENCOUNTER — OUTPATIENT (OUTPATIENT)
Dept: OUTPATIENT SERVICES | Facility: HOSPITAL | Age: 64
LOS: 1 days | End: 2024-06-17
Payer: COMMERCIAL

## 2024-06-17 ENCOUNTER — APPOINTMENT (OUTPATIENT)
Dept: ORTHOPEDIC SURGERY | Facility: CLINIC | Age: 64
End: 2024-06-17
Payer: COMMERCIAL

## 2024-06-17 ENCOUNTER — RESULT REVIEW (OUTPATIENT)
Age: 64
End: 2024-06-17

## 2024-06-17 VITALS
BODY MASS INDEX: 29.8 KG/M2 | WEIGHT: 220 LBS | HEIGHT: 72 IN | DIASTOLIC BLOOD PRESSURE: 76 MMHG | OXYGEN SATURATION: 98 % | HEART RATE: 59 BPM | SYSTOLIC BLOOD PRESSURE: 135 MMHG

## 2024-06-17 DIAGNOSIS — Z41.9 ENCOUNTER FOR PROCEDURE FOR PURPOSES OTHER THAN REMEDYING HEALTH STATE, UNSPECIFIED: Chronic | ICD-10-CM

## 2024-06-17 DIAGNOSIS — M17.0 BILATERAL PRIMARY OSTEOARTHRITIS OF KNEE: ICD-10-CM

## 2024-06-17 PROCEDURE — 73564 X-RAY EXAM KNEE 4 OR MORE: CPT

## 2024-06-17 PROCEDURE — 20610 DRAIN/INJ JOINT/BURSA W/O US: CPT | Mod: 50

## 2024-06-17 PROCEDURE — 73564 X-RAY EXAM KNEE 4 OR MORE: CPT | Mod: 26,50

## 2024-06-17 RX ORDER — MELOXICAM 7.5 MG/1
7.5 TABLET ORAL DAILY
Qty: 30 | Refills: 2 | Status: ACTIVE | COMMUNITY
Start: 2024-06-17 | End: 1900-01-01

## 2024-06-17 RX ORDER — HYALURONATE SODIUM 20 MG/2 ML
20 SYRINGE (ML) INTRAARTICULAR
Qty: 2 | Refills: 0 | Status: ACTIVE | OUTPATIENT
Start: 2024-06-17

## 2024-06-17 NOTE — PROCEDURE
[de-identified] :  Procedure: Knee joint injection Laterality:  BILATERAL Indication: Osteoarthritis - discussed with patient Skin prep: alcohol and chlorhexidine Anesthesia: ethyl chloride spray Needle: 20-gauge Portal: inferolateral Aspiration: none Injectate: 2cc of 2% lidocaine, 2cc of 0.5% bupivacaine, and 1cc of 40mg/mL triamcinolone Dressing: Band-aid Complications: None  - RTC in 3 months to repeat bilateral knee CSI - HA authorization submitted for bilateral knees - HEP and resistance bands provided - Rx Meloxicam

## 2024-06-17 NOTE — PROCEDURE
[de-identified] :  Procedure: Knee joint injection Laterality:  BILATERAL Indication: Osteoarthritis - discussed with patient Skin prep: alcohol and chlorhexidine Anesthesia: ethyl chloride spray Needle: 20-gauge Portal: inferolateral Aspiration: none Injectate: 2cc of 2% lidocaine, 2cc of 0.5% bupivacaine, and 1cc of 40mg/mL triamcinolone Dressing: Band-aid Complications: None  - RTC in 3 months to repeat bilateral knee CSI - HA authorization submitted for bilateral knees - HEP and resistance bands provided - Rx Meloxicam

## 2024-07-15 ENCOUNTER — APPOINTMENT (OUTPATIENT)
Dept: ORTHOPEDIC SURGERY | Facility: CLINIC | Age: 64
End: 2024-07-15
Payer: COMMERCIAL

## 2024-07-15 PROCEDURE — 20610 DRAIN/INJ JOINT/BURSA W/O US: CPT | Mod: 50

## 2024-07-22 ENCOUNTER — APPOINTMENT (OUTPATIENT)
Dept: ORTHOPEDIC SURGERY | Facility: CLINIC | Age: 64
End: 2024-07-22
Payer: COMMERCIAL

## 2024-07-22 VITALS
DIASTOLIC BLOOD PRESSURE: 75 MMHG | OXYGEN SATURATION: 99 % | BODY MASS INDEX: 29.8 KG/M2 | HEART RATE: 61 BPM | WEIGHT: 220 LBS | HEIGHT: 72 IN | SYSTOLIC BLOOD PRESSURE: 118 MMHG

## 2024-07-22 PROCEDURE — 20610 DRAIN/INJ JOINT/BURSA W/O US: CPT | Mod: 50

## 2024-07-22 NOTE — PROCEDURE
[de-identified] : Euflexxa bilateral knee joint injections #2 Lot No: [id number] Exp Date: 5- Man: [employer] Product code: [id number]  Procedure: Knee joint injection Laterality: BILATERAL Indication: Osteoarthritis - discussed with patient Skin prep: alcohol and chlorhexidine Anesthesia: ethyl chloride spray Needle: 20-gauge Portal: inferolateral Aspiration: none Injectate: Euflexxa 2/3 Dressing: Band-aid Complications: None  - RTC in 1 week to continue bilateral knee euflexxa series

## 2024-07-30 ENCOUNTER — APPOINTMENT (OUTPATIENT)
Dept: ORTHOPEDIC SURGERY | Facility: CLINIC | Age: 64
End: 2024-07-30
Payer: COMMERCIAL

## 2024-07-30 VITALS
WEIGHT: 220 LBS | HEIGHT: 72 IN | DIASTOLIC BLOOD PRESSURE: 69 MMHG | SYSTOLIC BLOOD PRESSURE: 113 MMHG | OXYGEN SATURATION: 98 % | HEART RATE: 63 BPM | BODY MASS INDEX: 29.8 KG/M2

## 2024-07-30 DIAGNOSIS — M17.0 BILATERAL PRIMARY OSTEOARTHRITIS OF KNEE: ICD-10-CM

## 2024-07-30 PROCEDURE — 20610 DRAIN/INJ JOINT/BURSA W/O US: CPT | Mod: 50

## 2024-07-30 NOTE — PROCEDURE
[de-identified] : Euflexxa bilateral knee joint injections #3 Lot No: [id number] Exp Date: 5- Man: [employer] Product code: [id number]  Procedure: Knee joint injection Laterality: BILATERAL Indication: Osteoarthritis - discussed with patient Skin prep: alcohol and chlorhexidine Anesthesia: ethyl chloride spray Needle: 20-gauge Portal: inferolateral Aspiration: none Injectate: Euflexxa 3/3 Dressing: Band-aid Complications: None  -Adivsed patient to contact the office in 5months to reorder bilateral knee EUFLEXXA injections

## 2024-09-12 ENCOUNTER — RX RENEWAL (OUTPATIENT)
Age: 64
End: 2024-09-12

## 2025-05-23 NOTE — BRIEF OPERATIVE NOTE - OPERATION/FINDINGS
[facility] Ambulatory Consult Note    Requesting Provider:  LAN Cervantes    Chief Complaint   Patient presents with    Office Visit    Vasectomy     Consult         Subjective:  Ahsan Ford is a 31 year old male seen today with a chief complaint of desired sterility/vasectomy.  Per chart review of old records, lab values/study results, and patient-given history:    1)  Desired sterility/vasectomy - new problem  - Patient has been considering vasectomy/sterility ~1.5 years.  No prior associated issues with infertility/erectile dysfunction.  No prior associated scrotal/inguinal surgery.  Modifying factors for desired sterility/vasectomy include having 2 kids, 5 and 4 months.   Patient is 100% sure he wishes to go through with a vasectomy.            Past Medical History:   Diagnosis Date    CMV (cytomegalovirus infection)  (CMD)     Contact dermatitis     Elevated AST (SGOT) 02/03/2020    Elevated liver enzymes 05/05/2023    Generalized anxiety disorder 05/03/2024    GERD (gastroesophageal reflux disease) 01/30/2020    Hypertriglyceridemia 02/03/2020    Insulin resistance 11/15/2024    Low HDL (under 40) 02/03/2020    Low serum testosterone level 04/05/2023    Migraine without aura and without status migrainosus, not intractable 01/30/2020    Trial of amitriptyline 1/2020    Primary hypertension 11/15/2024    Seasonal allergic rhinitis 05/03/2024       Past Surgical History:   Procedure Laterality Date    [place] tooth extraction         ALLERGIES:  No Known Allergies    Family History   Problem Relation Age of Onset    Hypertension Mother     Other Mother         Hiatal Hernia and GERD    Crohn's Disease Mother     Diabetes Father     Hypertension Father     Diabetes Maternal Grandmother     Hypertension Maternal Grandmother     Heart Maternal Grandmother     Hypertension Maternal Grandfather     Cancer, Lung Maternal Grandfather         adenocarcinoma    Heart Paternal Grandmother         CAD and  Pacemacker    Diabetes Paternal Grandmother     Heart Paternal Grandfather         heart valve    Lung Disease Paternal Grandfather         Pulmonary Fibrosis    Other Paternal Grandfather         degenerative spine    Hyperlipidemia Paternal Grandfather     Cancer, Skin Melanoma Paternal Uncle     Cancer, Skin Melanoma Paternal Uncle        Social History     Tobacco Use    Smoking status: Never    Smokeless tobacco: Never   Substance Use Topics    Alcohol use: Yes     Comment: rare    Drug use: No       Current Outpatient Medications   Medication Sig Dispense Refill    fluticasone-salmeterol 250-50 MCG/ACT inhaler Inhale 1 puff into the lungs in the morning and 1 puff in the evening. 1 each 0    doxycycline monohydrate (ADOXA) 100 MG tablet Take 1 tablet by mouth in the morning and 1 tablet in the evening. Do all this for 10 days. 20 tablet 0    SUMAtriptan (IMITREX) 50 MG tablet Take 1 tablet by mouth daily as needed for Migraine. Take 1 tablet by mouth at onset of migraine. May repeat after 2 hours if needed. Max 2 tabs in 24 hours. 12 tablet 1    topiramate (TOPAMAX) 25 MG tablet Take 1/2 tablet nighty for 2 weeks THEN increase to 1 tablet nightly. 30 tablet 3    omega-3 acid ethyl esters (LOVAZA) 1 g capsule Take 2 capsules by mouth in the morning and 2 capsules in the evening. TG's are over 900 (no over the counter substitution) 360 capsule 2    rosuvastatin (CRESTOR) 10 MG tablet TAKE ONE TABLET BY MOUTH EVERY DAY 90 tablet 2    metoPROLOL succinate (TOPROL-XL) 25 MG 24 hr tablet Take 1 tablet by mouth daily. 90 tablet 1    omeprazole (PrilOSEC) 20 MG capsule Take 1 capsule by mouth daily. 90 capsule 3    buPROPion (WELLBUTRIN) 75 MG tablet Take 1 tablet by mouth 2 times daily. 180 tablet 3    clonazePAM (KlonoPIN) 0.25 MG disintegrating tablet Take 1-2 tablets by mouth daily as needed for Anxiety (pre-flight). 10 tablet 0    albuterol 108 (90 Base) MCG/ACT inhaler Inhale 2 puffs into the lungs every 4 hours  as needed for Shortness of Breath or Wheezing. 1 each 0    Multiple Vitamins-Minerals (MENS ONE DAILY PO)       Magnesium 400 MG Tab Take 400 mg by mouth daily. 90 tablet 11     No current facility-administered medications for this visit.       Review of Systems:  I personally reviewed the review of systems history obtained and entered into the chart by the nurse.  I have reviewed and confirmed the pertinent positives and negatives with the patient and agree with the documentation.        Physical Exam:    Constitutional:  The patient is well nourished, in no acute distress, appears stated age.   Psychiatric:  Normal mood/affect.  Ears, Nose, Mouth, Throat:  Normal external appearance of ears  Respiratory:  Respiratory effort normal and non-labored.   Cardiovascular:  no apparent peripheral edema in upper or lower extremities  Gastrointestinal:  Soft, nontender, nondistended, no hernias appreciated  Musculoskeletal:  Normal gait.  Skin:  No obvious abnormal skin lesions, rashes  Genitourinary:  Scrotum without lesions.  Epididymides descended bilaterally without mass or tenderness. Testicles descended bilaterally symmetric without masses or tenderness.  Urethral meatus normal size and position.  Bilateral vas mobilized with relative ease        _________________________________________________________________________      Assessment/Plan:  Ahsan Ford is a 31 year old male with the following urologic issues:    1)  Desired sterility/vasectomy  New Problem with Workup Planned as Follows:  - I counseled the patient regarding vasectomies.  He understands that he is not sterile until he brings a sample back that shows no sperm.  He understands that there is still a 1 in 2000 chance of failure after this.  He understands the potential risks and complications including but not limited to bleeding, chronic testis pain, and infection.  I reiterated the following:  - Vasectomy is intended to be a permanent form of  contraception.   - Vasectomy does not produce immediate sterility.   - Following vasectomy, another form of contraception is required until vas occlusion is confirmed by post-vasectomy semen analysis (PVSA).   - Even after vas occlusion is confirmed, vasectomy is not 100% reliable in preventing pregnancy.   - The risk of pregnancy after vasectomy is approximately 1 in 2,000 for men who have post-vasectomy azoospermia or PVSA showing rare non-motile sperm (RNMS).   - Repeat vasectomy is necessary in <=1% of vasectomies, provided that a technique for vas occlusion known to have a low occlusive failure rate has been used.   - Patients should refrain from ejaculation for approximately one week after vasectomy.   - Options for fertility after vasectomy include vasectomy reversal and sperm retrieval with in vitro fertilization.  These options are not always successful, and they may be expensive.   - The rates of surgical complications such as symptomatic hematoma and infection are 1-2%.  These rates vary with the surgeon’s experience and the criteria used to diagnose these conditions.   - Chronic scrotal pain associated with negative impact on quality of life occurs after vasectomy in about 1-2% of men.  Few of these men require additional surgery.   - Other permanent and non-permanent alternatives to vasectomy are available.   - Patient prefers to pursue vasectomy in the OR due to anxiety with procedures.  Plan for this in June.      - Instructions provided as documented in the After Visit Summary.  - The patient and significant other indicated understanding of the diagnosis and agreed with the plan of care.         Thank you for allowing me to participate in the care of this patient.  Bhavana Wade MD      _________________________________________________________________________            I have discussed and recommended the following surgical procedure(s):      Surgery scheduling requirements include:  Date:  Thursday  6/19 to follow  Procedure:  Vasectomy - 27964  Diagnosis Code:  (Z30.09) Encounter for other general counseling or advice on contraception    Facility:  [facility]  Admission Type:  Outpatient Surgery  Time Needed:  30 min   Anesthesia:  MAC  Surgical Assist:  No  Co-Surgeon:  No    Preoperative History and Physical:  Will do at facility  Preoperative Labs:  none  Pregnancy test:  If patient is a female between the ages of 12 - 52 years, she will be asked to provide a urine sample when she arrives to day surgery for a pregnancy test.  Please have her be prepared for this.  Nursing visit for preoperative urine culture if patient has indwelling Isabel?:  No  Schedule postoperative appointment:  None  Patient Prep Instructions:  No bowel prep needed  Surgical wipes needed?:  he has the soaps  Special Equipment Instructions:  none  - **This patient DOES need to stop any blood thinners/anticoagulation prior to surgery**          SURGERY SCHEDULER, PLEASE COPY THE FOLLOWING INFORMATION TO PUT IN LETTER TO PATIENTS.  DO NOT USE YOUR OWN DOT PHRASE OR EDIT OLD LETTER PLEASE.  RATHER, PLEASE USE THE INFO BELOW AS IT IS OFTEN TIMES MODIFIED FOR EACH SPECIFIC PATIENT.      Blood thinning medications:  Medications to Avoid Prior to Your Urology procedure:     Do not take the following medications seven (7) days prior to procedure unless indicated with further details below.  Dr. Wade will let you know when to resume them.     Warfarin/Coumadin   Plavix   Lovenox   Xarelto - Please stop this 2 days (48 hours) prior to surgery date  Eliquis- Please stop this 3 days (72 hours) prior to surgery date    Aspirin (generic):   Brand Names:  Anacin, Brian, [place], Aspergum, Aspercin, Aspermin, Aspertab, Back-Quell, Duradyne, Empirin, Gemnisyn, Genprin, Gensan, Magnaprin, McNess Pain Tab, Momentum, P-A-C, Pain Reliever Tabs, Tri-Pain Caplets, Vanquish Caplet.     Ibuprofen (generic):   Brand Names:  Advil,  Nuprin, Motrin IB, Addaprin, Genpril, Ibufen 200, Menadol, Midol IB, Dristan Sinus, Ursinus Inlay Tabs, Dimetapp Sinus, Valparin, Haltran Tabs, Hakeem's Pills, Yobani.     Naproxen (generic) Brand Name:  Aleve     Toradol/Ketorolac     If you have a headache, backache, arthritis or other painful condition, please take Tylenol, Datril or some other non aspirin-containing product.  If you have questions as to whether a medication you're taking should be stopped, please contact Dr. Wade's clinic at least 1 week prior to your procedure.         Dr. Wade’s Instructions for Before Your Procedure    Dr. Wade’s surgery scheduler will be contacting you with the date of your procedure.  The surgery scheduler will also discuss details with you and possibly send you a letter with some preoperative instructions.  However, some general pre-procedure information is listed below.      You likely will be asked to be at the hospital  minutes prior to your procedure.    Start times of procedures are tentative and can be subject to change, but you will be contacted if this occurs.    You will be undergoing anesthesia, and thus you will need someone to drive you home and remain with you up to 24 hours after you have been discharged.    You may not have anything to eat or drink after midnight the night prior to your procedure.    In most circumstances, you will need to be off of any blood thinners as outlined above (Aspirin, Plavix, Coumadin, etc.) for 7-10 days prior to your procedure.  Dr. Wade will let you know if your case is an exception.    Dr. Wade may request that you be cleared for your surgery by your primary care doctor and/or your cardiologist (if applicable).  If so, Dr. Wade’s office staff or surgery scheduler will make you aware of this and you will need to schedule these appointments.    If applicable, please stop taking any GLP1 medications (Ozempic, Wegovy, Victoza, Trulicity, Saxenda, Mounjaro, etc.) for one full  week prior to your procedure.     If applicable, if you are taking Jardiance (empagliflozin), please stop taking this 72 hours (3 days) before your procedure.             stable

## 2025-06-03 RX ORDER — HYALURONATE SODIUM 20 MG/2 ML
20 SYRINGE (ML) INTRAARTICULAR
Qty: 6 | Refills: 0 | Status: ACTIVE | OUTPATIENT
Start: 2025-06-03

## 2025-06-30 ENCOUNTER — NON-APPOINTMENT (OUTPATIENT)
Age: 65
End: 2025-06-30

## 2025-06-30 ENCOUNTER — APPOINTMENT (OUTPATIENT)
Dept: ORTHOPEDIC SURGERY | Facility: CLINIC | Age: 65
End: 2025-06-30
Payer: COMMERCIAL

## 2025-06-30 PROCEDURE — 20610 DRAIN/INJ JOINT/BURSA W/O US: CPT | Mod: 50

## 2025-07-08 ENCOUNTER — APPOINTMENT (OUTPATIENT)
Dept: ORTHOPEDIC SURGERY | Facility: CLINIC | Age: 65
End: 2025-07-08
Payer: COMMERCIAL

## 2025-07-08 VITALS
DIASTOLIC BLOOD PRESSURE: 74 MMHG | BODY MASS INDEX: 29.8 KG/M2 | HEIGHT: 72 IN | WEIGHT: 220 LBS | OXYGEN SATURATION: 96 % | HEART RATE: 61 BPM | TEMPERATURE: 97.9 F | SYSTOLIC BLOOD PRESSURE: 115 MMHG

## 2025-07-08 PROCEDURE — 20610 DRAIN/INJ JOINT/BURSA W/O US: CPT | Mod: 50

## 2025-07-14 ENCOUNTER — APPOINTMENT (OUTPATIENT)
Dept: ORTHOPEDIC SURGERY | Facility: CLINIC | Age: 65
End: 2025-07-14
Payer: COMMERCIAL

## 2025-07-14 VITALS
WEIGHT: 220 LBS | BODY MASS INDEX: 29.8 KG/M2 | HEART RATE: 63 BPM | SYSTOLIC BLOOD PRESSURE: 111 MMHG | HEIGHT: 72 IN | OXYGEN SATURATION: 97 % | DIASTOLIC BLOOD PRESSURE: 70 MMHG | TEMPERATURE: 97.8 F

## 2025-07-14 PROCEDURE — 20610 DRAIN/INJ JOINT/BURSA W/O US: CPT | Mod: 50

## (undated) DEVICE — SUT MONOCRYL 4-0 27" PS-2 UNDYED

## (undated) DEVICE — SUPP ATHLETIC MALE XLG 44-55IN

## (undated) DEVICE — XI OBTURATOR OPTICAL BLADELESS 8MM

## (undated) DEVICE — XI SEAL UNIV 5- 8 MM

## (undated) DEVICE — DRSG SUPPORTER ADULT 3" WAISTBAND LRG

## (undated) DEVICE — SUT VICRYL 0 27" UR-6

## (undated) DEVICE — MARKING PEN W RULER

## (undated) DEVICE — DRAPE TOP SHEET 53" X 101"

## (undated) DEVICE — SUT PDO 0 1/2 CIRCLE 22MM NDL 20CM

## (undated) DEVICE — GLV 7 PROTEXIS (WHITE)

## (undated) DEVICE — SUT VICRYL 2-0 27" SH

## (undated) DEVICE — TROCAR COVIDIEN VERSAONE BLUNT TIP HASSAN 12MM

## (undated) DEVICE — D HELP - CLEARVIEW CLEARIFY SYSTEM

## (undated) DEVICE — SLV COMPRESSION KNEE MED

## (undated) DEVICE — WARMING BLANKET LOWER ADULT

## (undated) DEVICE — GLV 6.5 PROTEXIS (WHITE)

## (undated) DEVICE — GOWN ROYAL SILK XL

## (undated) DEVICE — XI DRAPE COLUMN

## (undated) DEVICE — XI ARM FORCEP FENESTRATED BIPOLAR 8MM

## (undated) DEVICE — ELCTR BOVIE PENCIL HANDPIECE ROCKER SWITCH 15FT

## (undated) DEVICE — SUT VICRYL 2-0 27" UR-6

## (undated) DEVICE — ELCTR GROUNDING PAD ADULT COVIDIEN

## (undated) DEVICE — XI DRAPE ARM

## (undated) DEVICE — SUT MAXON 0 30" GS-11

## (undated) DEVICE — DRSG SUPPORTER ADULT 3" WAISTBAND MED